# Patient Record
Sex: FEMALE | Race: WHITE | NOT HISPANIC OR LATINO | Employment: UNEMPLOYED | URBAN - NONMETROPOLITAN AREA
[De-identification: names, ages, dates, MRNs, and addresses within clinical notes are randomized per-mention and may not be internally consistent; named-entity substitution may affect disease eponyms.]

---

## 2017-09-27 ENCOUNTER — HOSPITAL ENCOUNTER (OUTPATIENT)
Dept: FAMILY MEDICINE CLINIC | Facility: CLINIC | Age: 42
Setting detail: SPECIMEN
Discharge: HOME | End: 2017-09-27
Attending: NURSE PRACTITIONER | Admitting: NURSE PRACTITIONER

## 2017-12-12 ENCOUNTER — HOSPITAL ENCOUNTER (OUTPATIENT)
Dept: ONCOLOGY | Facility: HOSPITAL | Age: 42
Discharge: HOME | End: 2017-12-12
Attending: INTERNAL MEDICINE | Admitting: INTERNAL MEDICINE

## 2017-12-12 ENCOUNTER — HOSPITAL ENCOUNTER (OUTPATIENT)
Dept: ONCOLOGY | Facility: CLINIC | Age: 42
Setting detail: INFUSION SERIES
Discharge: HOME | End: 2017-12-12
Attending: INTERNAL MEDICINE | Admitting: INTERNAL MEDICINE

## 2017-12-12 ENCOUNTER — CLINICAL SUPPORT (OUTPATIENT)
Dept: ONCOLOGY | Facility: HOSPITAL | Age: 42
End: 2017-12-12

## 2017-12-12 LAB
FERRITIN SERPL-MCNC: 26 NG/ML (ref 11–307)
FOLATE SERPL-MCNC: >24.8 NG/ML (ref 5.9–24.8)
IRON SATN MFR SERPL: 54 % (ref 15–50)
IRON SERPL-MCNC: 300 UG/DL (ref 28–170)
MAGNESIUM UR-MCNC: 1.95 % (ref 0.5–1.5)
RETICS/RBC NFR MANUAL: 0.07 10*6/UL
TIBC SERPL-MCNC: 559 UG/DL (ref 228–428)

## 2017-12-13 NOTE — PROGRESS NOTES
PATIENTS ONCOLOGY RECORD LOCATED IN [facility]      Subjective     Name:  ROBERTO LFOOD     Date:  2017  Address:  [address] [place] [address] [place] [facility]  Home: [phone]  :  1975 AGE:  42 y.o.        RECORDS OBTAINED:  Patients Oncology Record is located in [facility]

## 2018-01-15 ENCOUNTER — CLINICAL SUPPORT (OUTPATIENT)
Dept: ONCOLOGY | Facility: HOSPITAL | Age: 43
End: 2018-01-15

## 2018-01-15 ENCOUNTER — HOSPITAL ENCOUNTER (OUTPATIENT)
Dept: ONCOLOGY | Facility: CLINIC | Age: 43
Setting detail: INFUSION SERIES
Discharge: HOME | End: 2018-01-15
Attending: INTERNAL MEDICINE | Admitting: INTERNAL MEDICINE

## 2018-01-15 NOTE — PROGRESS NOTES
PATIENTS ONCOLOGY RECORD LOCATED IN [facility]      Subjective     Name:  ROBERTO FLOOD     Date:  01/15/2018  Address:  [address] [place] [address] [place] [facility]  Home: [phone]  :  1975 AGE:  42 y.o.        RECORDS OBTAINED:  Patients Oncology Record is located in [facility]

## 2018-01-29 ENCOUNTER — HOSPITAL ENCOUNTER (OUTPATIENT)
Dept: SLEEP MEDICINE | Facility: HOSPITAL | Age: 43
Discharge: HOME | End: 2018-01-29
Attending: PSYCHIATRY & NEUROLOGY | Admitting: PSYCHIATRY & NEUROLOGY

## 2018-03-13 ENCOUNTER — HOSPITAL ENCOUNTER (OUTPATIENT)
Dept: FAMILY MEDICINE CLINIC | Facility: CLINIC | Age: 43
Setting detail: SPECIMEN
Discharge: HOME | End: 2018-03-13
Attending: FAMILY MEDICINE | Admitting: FAMILY MEDICINE

## 2018-03-13 LAB
ALBUMIN SERPL-MCNC: 3.5 G/DL (ref 3.5–4.8)
ALBUMIN/GLOB SERPL: 1.2 {RATIO} (ref 1–1.7)
ALP SERPL-CCNC: 57 IU/L (ref 32–91)
ALT SERPL-CCNC: 43 IU/L (ref 14–54)
ANION GAP SERPL CALC-SCNC: 17.2 MMOL/L (ref 10–20)
AST SERPL-CCNC: 52 IU/L (ref 15–41)
BILIRUB SERPL-MCNC: 0.6 MG/DL (ref 0.3–1.2)
BUN SERPL-MCNC: 8 MG/DL (ref 8–20)
BUN/CREAT SERPL: 13.3 (ref 5.4–26.2)
CALCIUM SERPL-MCNC: 9.2 MG/DL (ref 8.9–10.3)
CHLORIDE SERPL-SCNC: 100 MMOL/L (ref 101–111)
CONV CO2: 23 MMOL/L (ref 22–32)
CONV TOTAL PROTEIN: 6.4 G/DL (ref 6.1–7.9)
CREAT UR-MCNC: 0.6 MG/DL (ref 0.4–1)
GLOBULIN UR ELPH-MCNC: 2.9 G/DL (ref 2.5–3.8)
GLUCOSE SERPL-MCNC: 82 MG/DL (ref 65–99)
POTASSIUM SERPL-SCNC: 4.2 MMOL/L (ref 3.6–5.1)
SODIUM SERPL-SCNC: 136 MMOL/L (ref 136–144)

## 2018-08-01 ENCOUNTER — HOSPITAL ENCOUNTER (OUTPATIENT)
Dept: FAMILY MEDICINE CLINIC | Facility: CLINIC | Age: 43
Setting detail: SPECIMEN
Discharge: HOME | End: 2018-08-01
Attending: FAMILY MEDICINE | Admitting: FAMILY MEDICINE

## 2018-08-01 LAB
ALBUMIN SERPL-MCNC: 3.8 G/DL (ref 3.5–4.8)
ALBUMIN/GLOB SERPL: 1.1 {RATIO} (ref 1–1.7)
ALP SERPL-CCNC: 58 IU/L (ref 32–91)
ALT SERPL-CCNC: 50 IU/L (ref 14–54)
ANION GAP SERPL CALC-SCNC: 11.5 MMOL/L (ref 10–20)
AST SERPL-CCNC: 48 IU/L (ref 15–41)
BASOPHILS # BLD AUTO: 0 10*3/UL (ref 0–0.2)
BASOPHILS NFR BLD AUTO: 1 % (ref 0–2)
BILIRUB SERPL-MCNC: 0.4 MG/DL (ref 0.3–1.2)
BUN SERPL-MCNC: 8 MG/DL (ref 8–20)
BUN/CREAT SERPL: 11.4 (ref 5.4–26.2)
CALCIUM SERPL-MCNC: 9.1 MG/DL (ref 8.9–10.3)
CHLORIDE SERPL-SCNC: 101 MMOL/L (ref 101–111)
CONV CO2: 28 MMOL/L (ref 22–32)
CONV TOTAL PROTEIN: 7.4 G/DL (ref 6.1–7.9)
CREAT UR-MCNC: 0.7 MG/DL (ref 0.4–1)
DIFFERENTIAL METHOD BLD: (no result)
EOSINOPHIL # BLD AUTO: 0.2 10*3/UL (ref 0–0.3)
EOSINOPHIL # BLD AUTO: 3 % (ref 0–3)
ERYTHROCYTE [DISTWIDTH] IN BLOOD BY AUTOMATED COUNT: 14.1 % (ref 11.5–14.5)
GLOBULIN UR ELPH-MCNC: 3.6 G/DL (ref 2.5–3.8)
GLUCOSE SERPL-MCNC: 80 MG/DL (ref 65–99)
HCT VFR BLD AUTO: 40.1 % (ref 35–49)
HGB BLD-MCNC: 14.2 G/DL (ref 12–15)
IRON SATN MFR SERPL: 24 % (ref 15–50)
IRON SERPL-MCNC: 94 UG/DL (ref 28–170)
LYMPHOCYTES # BLD AUTO: 2.1 10*3/UL (ref 0.8–4.8)
LYMPHOCYTES NFR BLD AUTO: 34 % (ref 18–42)
MCH RBC QN AUTO: 34.7 PG (ref 26–32)
MCHC RBC AUTO-ENTMCNC: 35.4 G/DL (ref 32–36)
MCV RBC AUTO: 98 FL (ref 80–94)
MONOCYTES # BLD AUTO: 0.4 10*3/UL (ref 0.1–1.3)
MONOCYTES NFR BLD AUTO: 7 % (ref 2–11)
NEUTROPHILS # BLD AUTO: 3.4 10*3/UL (ref 2.3–8.6)
NEUTROPHILS NFR BLD AUTO: 55 % (ref 50–75)
NRBC BLD AUTO-RTO: 0 /100{WBCS}
NRBC/RBC NFR BLD MANUAL: 0 10*3/UL
PLATELET # BLD AUTO: 222 10*3/UL (ref 150–450)
PMV BLD AUTO: 9 FL (ref 7.4–10.4)
POTASSIUM SERPL-SCNC: 3.5 MMOL/L (ref 3.6–5.1)
RBC # BLD AUTO: 4.09 10*6/UL (ref 4–5.4)
SODIUM SERPL-SCNC: 137 MMOL/L (ref 136–144)
TIBC SERPL-MCNC: 392 UG/DL (ref 228–428)
WBC # BLD AUTO: 6 10*3/UL (ref 4.5–11.5)

## 2018-08-02 LAB
CONV HIV-1/ HIV-2: NORMAL
CONV HIV-1/ HIV-2: NORMAL
HAV IGM SERPL QL IA: NONREACTIVE
HBV CORE IGM SERPL QL IA: NONREACTIVE
HBV SURFACE AG SERPL QL IA: NONREACTIVE
HCV AB SER DONR QL: ABNORMAL

## 2018-10-24 ENCOUNTER — HOSPITAL ENCOUNTER (OUTPATIENT)
Dept: FAMILY MEDICINE CLINIC | Facility: CLINIC | Age: 43
Setting detail: SPECIMEN
Discharge: HOME | End: 2018-10-24
Attending: FAMILY MEDICINE | Admitting: FAMILY MEDICINE

## 2018-10-24 LAB
FERRITIN SERPL-MCNC: 63 NG/ML (ref 11–307)
IRON SERPL-MCNC: 140 UG/DL (ref 28–170)

## 2018-12-20 ENCOUNTER — HOSPITAL ENCOUNTER (OUTPATIENT)
Dept: FAMILY MEDICINE CLINIC | Facility: CLINIC | Age: 43
Setting detail: SPECIMEN
Discharge: HOME | End: 2018-12-20
Attending: FAMILY MEDICINE | Admitting: FAMILY MEDICINE

## 2018-12-20 ENCOUNTER — CONVERSION ENCOUNTER (OUTPATIENT)
Dept: CARDIOLOGY | Facility: CLINIC | Age: 43
End: 2018-12-20

## 2018-12-20 LAB
ANION GAP SERPL CALC-SCNC: 13.2 MMOL/L (ref 10–20)
BUN SERPL-MCNC: 7 MG/DL (ref 8–20)
BUN/CREAT SERPL: 10 (ref 5.4–26.2)
CALCIUM SERPL-MCNC: 8.7 MG/DL (ref 8.9–10.3)
CHLORIDE SERPL-SCNC: 99 MMOL/L (ref 101–111)
CONV CO2: 25 MMOL/L (ref 22–32)
CREAT UR-MCNC: 0.7 MG/DL (ref 0.4–1)
GLUCOSE SERPL-MCNC: 62 MG/DL (ref 65–99)
POTASSIUM SERPL-SCNC: 3.2 MMOL/L (ref 3.6–5.1)
SODIUM SERPL-SCNC: 134 MMOL/L (ref 136–144)

## 2019-01-30 ENCOUNTER — HOSPITAL ENCOUNTER (OUTPATIENT)
Dept: FAMILY MEDICINE CLINIC | Facility: CLINIC | Age: 44
Setting detail: SPECIMEN
Discharge: HOME | End: 2019-01-30
Attending: FAMILY MEDICINE | Admitting: FAMILY MEDICINE

## 2019-01-30 LAB
ANION GAP SERPL CALC-SCNC: 14.1 MMOL/L (ref 10–20)
BASOPHILS # BLD AUTO: 0.1 10*3/UL (ref 0–0.2)
BASOPHILS NFR BLD AUTO: 1 % (ref 0–2)
BUN SERPL-MCNC: 9 MG/DL (ref 8–20)
BUN/CREAT SERPL: 12.9 (ref 5.4–26.2)
CALCIUM SERPL-MCNC: 9.1 MG/DL (ref 8.9–10.3)
CHLORIDE SERPL-SCNC: 101 MMOL/L (ref 101–111)
CONV CO2: 25 MMOL/L (ref 22–32)
CREAT UR-MCNC: 0.7 MG/DL (ref 0.4–1)
DIFFERENTIAL METHOD BLD: (no result)
EOSINOPHIL # BLD AUTO: 0.2 10*3/UL (ref 0–0.3)
EOSINOPHIL # BLD AUTO: 4 % (ref 0–3)
ERYTHROCYTE [DISTWIDTH] IN BLOOD BY AUTOMATED COUNT: 14 % (ref 11.5–14.5)
GLUCOSE SERPL-MCNC: 68 MG/DL (ref 65–99)
HCT VFR BLD AUTO: 45.7 % (ref 35–49)
HGB BLD-MCNC: 15.8 G/DL (ref 12–15)
LYMPHOCYTES # BLD AUTO: 2.2 10*3/UL (ref 0.8–4.8)
LYMPHOCYTES NFR BLD AUTO: 39 % (ref 18–42)
MCH RBC QN AUTO: 34.3 PG (ref 26–32)
MCHC RBC AUTO-ENTMCNC: 34.6 G/DL (ref 32–36)
MCV RBC AUTO: 98.9 FL (ref 80–94)
MONOCYTES # BLD AUTO: 0.4 10*3/UL (ref 0.1–1.3)
MONOCYTES NFR BLD AUTO: 8 % (ref 2–11)
NEUTROPHILS # BLD AUTO: 2.8 10*3/UL (ref 2.3–8.6)
NEUTROPHILS NFR BLD AUTO: 48 % (ref 50–75)
NRBC BLD AUTO-RTO: 0 /100{WBCS}
NRBC/RBC NFR BLD MANUAL: 0 10*3/UL
PLATELET # BLD AUTO: 186 10*3/UL (ref 150–450)
PMV BLD AUTO: 9.9 FL (ref 7.4–10.4)
POTASSIUM SERPL-SCNC: 4.1 MMOL/L (ref 3.6–5.1)
RBC # BLD AUTO: 4.62 10*6/UL (ref 4–5.4)
SODIUM SERPL-SCNC: 136 MMOL/L (ref 136–144)
WBC # BLD AUTO: 5.7 10*3/UL (ref 4.5–11.5)

## 2019-06-03 VITALS — DIASTOLIC BLOOD PRESSURE: 96 MMHG | SYSTOLIC BLOOD PRESSURE: 157 MMHG

## 2019-06-18 ENCOUNTER — TELEPHONE (OUTPATIENT)
Dept: CARDIOLOGY | Facility: CLINIC | Age: 44
End: 2019-06-18

## 2019-06-18 NOTE — TELEPHONE ENCOUNTER
Pt called requesting cardiac clearance to have an ablation. Please fax letter to [facility] at [phone]

## 2019-06-19 ENCOUNTER — TELEPHONE (OUTPATIENT)
Dept: CARDIOLOGY | Facility: CLINIC | Age: 44
End: 2019-06-19

## 2019-06-19 RX ORDER — BUSPIRONE HYDROCHLORIDE 15 MG/1
TABLET ORAL
Qty: 90 TABLET | Refills: 0 | Status: SHIPPED | OUTPATIENT
Start: 2019-06-19 | End: 2019-07-17 | Stop reason: SDUPTHER

## 2019-06-28 DIAGNOSIS — I63.89 CEREBROVASCULAR ACCIDENT (CVA) DUE TO OTHER MECHANISM (HCC): Primary | ICD-10-CM

## 2019-06-28 RX ORDER — CLOPIDOGREL BISULFATE 75 MG/1
75 TABLET ORAL DAILY
Qty: 30 TABLET | Refills: 1 | Status: SHIPPED | OUTPATIENT
Start: 2019-06-28 | End: 2019-07-01 | Stop reason: SDUPTHER

## 2019-07-01 RX ORDER — CLOPIDOGREL BISULFATE 75 MG/1
75 TABLET ORAL DAILY
Qty: 30 TABLET | Refills: 1 | Status: SHIPPED | OUTPATIENT
Start: 2019-07-01 | End: 2019-07-03 | Stop reason: SDUPTHER

## 2019-07-03 RX ORDER — CLOPIDOGREL BISULFATE 75 MG/1
75 TABLET ORAL DAILY
Qty: 30 TABLET | Refills: 0 | Status: SHIPPED | OUTPATIENT
Start: 2019-07-03 | End: 2019-11-06 | Stop reason: SDUPTHER

## 2019-07-17 RX ORDER — BUSPIRONE HYDROCHLORIDE 15 MG/1
TABLET ORAL
Qty: 90 TABLET | Refills: 0 | Status: SHIPPED | OUTPATIENT
Start: 2019-07-17 | End: 2019-08-20 | Stop reason: SDUPTHER

## 2019-07-30 ENCOUNTER — OFFICE VISIT (OUTPATIENT)
Dept: FAMILY MEDICINE CLINIC | Facility: CLINIC | Age: 44
End: 2019-07-30

## 2019-07-30 VITALS
TEMPERATURE: 97.9 F | OXYGEN SATURATION: 97 % | HEIGHT: 65 IN | WEIGHT: 273 LBS | DIASTOLIC BLOOD PRESSURE: 87 MMHG | SYSTOLIC BLOOD PRESSURE: 123 MMHG | HEART RATE: 73 BPM | BODY MASS INDEX: 45.48 KG/M2

## 2019-07-30 DIAGNOSIS — I10 ESSENTIAL HYPERTENSION: ICD-10-CM

## 2019-07-30 DIAGNOSIS — E87.6 HYPOKALEMIA: Primary | ICD-10-CM

## 2019-07-30 DIAGNOSIS — F17.200 TOBACCO USE DISORDER: ICD-10-CM

## 2019-07-30 DIAGNOSIS — B19.20 HEPATITIS C VIRUS INFECTION WITHOUT HEPATIC COMA, UNSPECIFIED CHRONICITY: ICD-10-CM

## 2019-07-30 PROCEDURE — 99213 OFFICE O/P EST LOW 20 MIN: CPT | Performed by: FAMILY MEDICINE

## 2019-07-30 RX ORDER — METOPROLOL SUCCINATE 25 MG/1
25 TABLET, EXTENDED RELEASE ORAL DAILY
Refills: 3 | COMMUNITY
Start: 2019-06-30 | End: 2020-07-06

## 2019-07-30 RX ORDER — LOSARTAN POTASSIUM 100 MG/1
100 TABLET ORAL DAILY
Refills: 0 | COMMUNITY
Start: 2019-05-02 | End: 2020-02-05 | Stop reason: SDUPTHER

## 2019-07-30 RX ORDER — HYDROCHLOROTHIAZIDE 12.5 MG/1
12.5 CAPSULE, GELATIN COATED ORAL DAILY
Refills: 0 | COMMUNITY
Start: 2019-05-23 | End: 2019-08-29 | Stop reason: SDUPTHER

## 2019-07-30 RX ORDER — ISOSORBIDE MONONITRATE 30 MG/1
30 TABLET, EXTENDED RELEASE ORAL DAILY
Refills: 3 | COMMUNITY
Start: 2019-07-10 | End: 2019-08-08 | Stop reason: SDUPTHER

## 2019-07-30 RX ORDER — FERROUS SULFATE 325(65) MG
1 TABLET ORAL DAILY
Refills: 0 | COMMUNITY
Start: 2019-05-28 | End: 2019-09-03 | Stop reason: SDUPTHER

## 2019-07-30 RX ORDER — ESCITALOPRAM OXALATE 10 MG/1
10 TABLET ORAL DAILY
Refills: 1 | COMMUNITY
Start: 2019-06-11 | End: 2020-01-08 | Stop reason: SDUPTHER

## 2019-07-30 RX ORDER — ASPIRIN 81 MG/1
81 TABLET, CHEWABLE ORAL DAILY
COMMUNITY
Start: 2019-04-24

## 2019-07-30 RX ORDER — POTASSIUM CHLORIDE 1500 MG/1
20 TABLET, EXTENDED RELEASE ORAL DAILY
Refills: 0 | COMMUNITY
Start: 2019-05-02 | End: 2019-07-30 | Stop reason: SDUPTHER

## 2019-07-30 RX ORDER — POTASSIUM CHLORIDE 1500 MG/1
20 TABLET, EXTENDED RELEASE ORAL DAILY
Qty: 90 TABLET | Refills: 1 | Status: SHIPPED | OUTPATIENT
Start: 2019-07-30 | End: 2019-11-06 | Stop reason: SDUPTHER

## 2019-07-30 RX ORDER — AMLODIPINE BESYLATE 10 MG/1
10 TABLET ORAL DAILY
Refills: 3 | COMMUNITY
Start: 2019-06-27 | End: 2019-08-29 | Stop reason: SDUPTHER

## 2019-07-30 RX ORDER — ATORVASTATIN CALCIUM 10 MG/1
10 TABLET, FILM COATED ORAL
Refills: 5 | COMMUNITY
Start: 2019-06-27 | End: 2019-08-24 | Stop reason: SDUPTHER

## 2019-07-30 NOTE — PROGRESS NOTES
Subjective   Odalis Kaur is a 43 y.o. female.     No problems updated.  History of Present Illness   Pt had uterine ablation 19 and is doing well.  She needs refills of med for hypokalemia.  Labs reviewed    The following portions of the patient's history were reviewed and updated as appropriate: allergies, current medications, past family history, past medical history, past social history, past surgical history and problem list.    Past Medical History:   Diagnosis Date   • Adnexal cyst     LESION   • B12 deficiency    • Depression    • Hepatitis C     TREATMENT PENDING GI   • Hyperlipidemia    • MICHELE (obstructive sleep apnea)    • PAD (peripheral artery disease) (CMS/HCC)    • Paranasal sinus disease    • Personal history of DVT (deep vein thrombosis)    • Pulmonary nodule    • Snoring    • Stroke (CMS/HCC)     LEFT LIP AND SLIGHT FACIAL DROOP 100% OCCLUSION OF  TELMA   • Tobacco use    • UTI (urinary tract infection)        Past Surgical History:   Procedure Laterality Date   •  SECTION     • TONSILLECTOMY     • TUBAL ABDOMINAL LIGATION Bilateral        Family History   Problem Relation Age of Onset   • Hypertension Mother    • Cancer Maternal Grandmother    • Bone cancer Maternal Grandmother    • Brain cancer Maternal Grandmother    • Prostate cancer Maternal Grandfather        Review of Systems   Constitutional: Negative for fatigue, unexpected weight gain and unexpected weight loss.   HENT: Negative for congestion, sinus pressure and sore throat.    Eyes: Negative for blurred vision and visual disturbance.   Respiratory: Negative for cough, shortness of breath and wheezing.    Cardiovascular: Negative for chest pain, palpitations and leg swelling.   Gastrointestinal: Negative for abdominal pain, constipation, diarrhea, nausea, vomiting, GERD and indigestion.   Musculoskeletal: Negative for arthralgias, back pain, gait problem, joint swelling and neck pain.   Skin: Negative for rash, skin lesions  and bruise.   Allergic/Immunologic: Negative for environmental allergies.   Neurological: Negative for dizziness, tremors, syncope, weakness, light-headedness, headache and memory problem.   Psychiatric/Behavioral: Negative for sleep disturbance, depressed mood and stress. The patient is not nervous/anxious.        Objective   Physical Exam   Constitutional: She is oriented to person, place, and time. She appears well-developed and well-nourished. No distress.   HENT:   Head: Normocephalic and atraumatic.   Right Ear: External ear normal.   Left Ear: External ear normal.   Nose: Nose normal.   Mouth/Throat: Oropharynx is clear and moist.   Eyes: EOM are normal. Pupils are equal, round, and reactive to light.   Neck: Normal range of motion. Neck supple. No thyromegaly present.   Cardiovascular: Normal rate, regular rhythm and normal heart sounds. Exam reveals no gallop and no friction rub.   No murmur heard.  Pulmonary/Chest: Effort normal. She has no wheezes.   Abdominal: Soft. There is no tenderness.   Musculoskeletal: Normal range of motion. She exhibits no edema, tenderness or deformity.   Lymphadenopathy:     She has no cervical adenopathy.   Neurological: She is alert and oriented to person, place, and time. No cranial nerve deficit.   Skin: Skin is warm and dry. No rash noted. She is not diaphoretic.   Psychiatric: She has a normal mood and affect. Her behavior is normal. Judgment and thought content normal.   Nursing note and vitals reviewed.        Assessment/Plan   There are no diagnoses linked to this encounter.           Chief Complaint   Patient presents with   • Follow-up     3 month follow up/HTN     Vitals:    07/30/19 1210   BP: 123/87   Pulse: 73   Temp: 97.9 °F (36.6 °C)   SpO2: 97%     No results found for: HGBA1C, POCGLU, LDL

## 2019-08-08 RX ORDER — ISOSORBIDE MONONITRATE 30 MG/1
TABLET, EXTENDED RELEASE ORAL
Qty: 30 TABLET | Refills: 3 | Status: SHIPPED | OUTPATIENT
Start: 2019-08-08 | End: 2019-12-18 | Stop reason: SDUPTHER

## 2019-08-21 RX ORDER — BUSPIRONE HYDROCHLORIDE 15 MG/1
TABLET ORAL
Qty: 90 TABLET | Refills: 4 | Status: SHIPPED | OUTPATIENT
Start: 2019-08-21 | End: 2020-02-05 | Stop reason: SDUPTHER

## 2019-08-26 RX ORDER — ATORVASTATIN CALCIUM 10 MG/1
TABLET, FILM COATED ORAL
Qty: 30 TABLET | Refills: 5 | Status: SHIPPED | OUTPATIENT
Start: 2019-08-26 | End: 2020-03-06

## 2019-08-29 RX ORDER — HYDROCHLOROTHIAZIDE 12.5 MG/1
12.5 CAPSULE, GELATIN COATED ORAL DAILY
Qty: 90 CAPSULE | Refills: 0 | Status: SHIPPED | OUTPATIENT
Start: 2019-08-29 | End: 2019-12-06 | Stop reason: SDUPTHER

## 2019-08-29 RX ORDER — AMLODIPINE BESYLATE 10 MG/1
10 TABLET ORAL DAILY
Qty: 90 TABLET | Refills: 2 | Status: SHIPPED | OUTPATIENT
Start: 2019-08-29 | End: 2019-10-02 | Stop reason: SDUPTHER

## 2019-09-03 RX ORDER — FERROUS SULFATE 325(65) MG
1 TABLET ORAL DAILY
Qty: 90 TABLET | Refills: 0 | Status: SHIPPED | OUTPATIENT
Start: 2019-09-03 | End: 2019-12-06 | Stop reason: SDUPTHER

## 2019-10-02 RX ORDER — AMLODIPINE BESYLATE 10 MG/1
TABLET ORAL
Qty: 30 TABLET | Refills: 0 | Status: SHIPPED | OUTPATIENT
Start: 2019-10-02 | End: 2019-11-02 | Stop reason: SDUPTHER

## 2019-11-04 RX ORDER — AMLODIPINE BESYLATE 10 MG/1
TABLET ORAL
Qty: 30 TABLET | Refills: 0 | Status: SHIPPED | OUTPATIENT
Start: 2019-11-04 | End: 2019-12-02 | Stop reason: SDUPTHER

## 2019-11-06 ENCOUNTER — OFFICE VISIT (OUTPATIENT)
Dept: FAMILY MEDICINE CLINIC | Facility: CLINIC | Age: 44
End: 2019-11-06

## 2019-11-06 VITALS
TEMPERATURE: 97.3 F | SYSTOLIC BLOOD PRESSURE: 145 MMHG | HEIGHT: 65 IN | DIASTOLIC BLOOD PRESSURE: 85 MMHG | BODY MASS INDEX: 44.98 KG/M2 | OXYGEN SATURATION: 100 % | RESPIRATION RATE: 18 BRPM | HEART RATE: 61 BPM | WEIGHT: 270 LBS

## 2019-11-06 DIAGNOSIS — Z23 NEED FOR PROPHYLACTIC VACCINATION AND INOCULATION AGAINST INFLUENZA: Primary | ICD-10-CM

## 2019-11-06 DIAGNOSIS — I63.89 CEREBROVASCULAR ACCIDENT (CVA) DUE TO OTHER MECHANISM (HCC): ICD-10-CM

## 2019-11-06 DIAGNOSIS — E78.2 MIXED HYPERLIPIDEMIA: ICD-10-CM

## 2019-11-06 DIAGNOSIS — E53.8 VITAMIN B12 DEFICIENCY: ICD-10-CM

## 2019-11-06 DIAGNOSIS — E87.6 HYPOKALEMIA: ICD-10-CM

## 2019-11-06 DIAGNOSIS — R53.83 OTHER FATIGUE: ICD-10-CM

## 2019-11-06 DIAGNOSIS — E55.9 VITAMIN D DEFICIENCY: ICD-10-CM

## 2019-11-06 DIAGNOSIS — R73.9 HYPERGLYCEMIA: ICD-10-CM

## 2019-11-06 PROCEDURE — 99213 OFFICE O/P EST LOW 20 MIN: CPT | Performed by: NURSE PRACTITIONER

## 2019-11-06 PROCEDURE — 90674 CCIIV4 VAC NO PRSV 0.5 ML IM: CPT | Performed by: NURSE PRACTITIONER

## 2019-11-06 PROCEDURE — 90471 IMMUNIZATION ADMIN: CPT | Performed by: NURSE PRACTITIONER

## 2019-11-06 RX ORDER — POTASSIUM CHLORIDE 1500 MG/1
20 TABLET, EXTENDED RELEASE ORAL DAILY
Qty: 90 TABLET | Refills: 1 | Status: SHIPPED | OUTPATIENT
Start: 2019-11-06 | End: 2020-02-05 | Stop reason: SDUPTHER

## 2019-11-06 RX ORDER — CLOPIDOGREL BISULFATE 75 MG/1
75 TABLET ORAL DAILY
Qty: 90 TABLET | Refills: 0 | Status: SHIPPED | OUTPATIENT
Start: 2019-11-06 | End: 2020-02-05 | Stop reason: SDUPTHER

## 2019-12-02 RX ORDER — AMLODIPINE BESYLATE 10 MG/1
TABLET ORAL
Qty: 30 TABLET | Refills: 0 | Status: SHIPPED | OUTPATIENT
Start: 2019-12-02 | End: 2020-01-06

## 2019-12-02 RX ORDER — PNV NO.95/FERROUS FUM/FOLIC AC 28MG-0.8MG
TABLET ORAL
Qty: 90 TABLET | Refills: 0 | Status: CANCELLED | OUTPATIENT
Start: 2019-12-02

## 2019-12-06 RX ORDER — HYDROCHLOROTHIAZIDE 12.5 MG/1
12.5 CAPSULE, GELATIN COATED ORAL DAILY
Qty: 90 CAPSULE | Refills: 0 | Status: SHIPPED | OUTPATIENT
Start: 2019-12-06 | End: 2020-02-05 | Stop reason: SDUPTHER

## 2019-12-06 RX ORDER — FERROUS SULFATE 325(65) MG
1 TABLET ORAL DAILY
Qty: 90 TABLET | Refills: 0 | Status: SHIPPED | OUTPATIENT
Start: 2019-12-06 | End: 2020-02-05 | Stop reason: SDUPTHER

## 2019-12-18 RX ORDER — ISOSORBIDE MONONITRATE 30 MG/1
TABLET, EXTENDED RELEASE ORAL
Qty: 90 TABLET | Refills: 1 | Status: SHIPPED | OUTPATIENT
Start: 2019-12-18 | End: 2020-06-22

## 2020-01-06 RX ORDER — AMLODIPINE BESYLATE 10 MG/1
TABLET ORAL
Qty: 30 TABLET | Refills: 0 | Status: SHIPPED | OUTPATIENT
Start: 2020-01-06 | End: 2020-02-03

## 2020-01-08 RX ORDER — ESCITALOPRAM OXALATE 10 MG/1
10 TABLET ORAL DAILY
Qty: 90 TABLET | Refills: 0 | Status: SHIPPED | OUTPATIENT
Start: 2020-01-08 | End: 2020-02-05 | Stop reason: SDUPTHER

## 2020-02-03 RX ORDER — AMLODIPINE BESYLATE 10 MG/1
TABLET ORAL
Qty: 30 TABLET | Refills: 0 | Status: SHIPPED | OUTPATIENT
Start: 2020-02-03 | End: 2020-02-05 | Stop reason: SDUPTHER

## 2020-02-05 ENCOUNTER — OFFICE VISIT (OUTPATIENT)
Dept: FAMILY MEDICINE CLINIC | Facility: CLINIC | Age: 45
End: 2020-02-05

## 2020-02-05 VITALS
TEMPERATURE: 98 F | DIASTOLIC BLOOD PRESSURE: 86 MMHG | WEIGHT: 265 LBS | OXYGEN SATURATION: 98 % | BODY MASS INDEX: 44.15 KG/M2 | SYSTOLIC BLOOD PRESSURE: 129 MMHG | HEIGHT: 65 IN | HEART RATE: 70 BPM

## 2020-02-05 DIAGNOSIS — Z12.39 SCREENING FOR MALIGNANT NEOPLASM OF BREAST: ICD-10-CM

## 2020-02-05 DIAGNOSIS — N39.46 MIXED STRESS AND URGE URINARY INCONTINENCE: ICD-10-CM

## 2020-02-05 DIAGNOSIS — E61.1 IRON DEFICIENCY: ICD-10-CM

## 2020-02-05 DIAGNOSIS — I63.89 CEREBROVASCULAR ACCIDENT (CVA) DUE TO OTHER MECHANISM (HCC): ICD-10-CM

## 2020-02-05 DIAGNOSIS — Z79.899 MEDICATION MANAGEMENT: ICD-10-CM

## 2020-02-05 DIAGNOSIS — E78.49 OTHER HYPERLIPIDEMIA: ICD-10-CM

## 2020-02-05 DIAGNOSIS — E55.9 VITAMIN D DEFICIENCY: Primary | ICD-10-CM

## 2020-02-05 DIAGNOSIS — R53.82 CHRONIC FATIGUE: ICD-10-CM

## 2020-02-05 PROCEDURE — 80053 COMPREHEN METABOLIC PANEL: CPT | Performed by: FAMILY MEDICINE

## 2020-02-05 PROCEDURE — 82607 VITAMIN B-12: CPT | Performed by: FAMILY MEDICINE

## 2020-02-05 PROCEDURE — 84466 ASSAY OF TRANSFERRIN: CPT | Performed by: FAMILY MEDICINE

## 2020-02-05 PROCEDURE — 82306 VITAMIN D 25 HYDROXY: CPT | Performed by: FAMILY MEDICINE

## 2020-02-05 PROCEDURE — 85025 COMPLETE CBC W/AUTO DIFF WBC: CPT | Performed by: FAMILY MEDICINE

## 2020-02-05 PROCEDURE — 80061 LIPID PANEL: CPT | Performed by: FAMILY MEDICINE

## 2020-02-05 PROCEDURE — 99214 OFFICE O/P EST MOD 30 MIN: CPT | Performed by: FAMILY MEDICINE

## 2020-02-05 PROCEDURE — 83540 ASSAY OF IRON: CPT | Performed by: FAMILY MEDICINE

## 2020-02-05 PROCEDURE — 82728 ASSAY OF FERRITIN: CPT | Performed by: FAMILY MEDICINE

## 2020-02-05 RX ORDER — POTASSIUM CHLORIDE 1500 MG/1
20 TABLET, EXTENDED RELEASE ORAL DAILY
Qty: 90 TABLET | Refills: 1 | Status: SHIPPED | OUTPATIENT
Start: 2020-02-05 | End: 2020-09-09 | Stop reason: SDUPTHER

## 2020-02-05 RX ORDER — BUSPIRONE HYDROCHLORIDE 15 MG/1
15 TABLET ORAL 3 TIMES DAILY
Qty: 270 TABLET | Refills: 1 | Status: SHIPPED | OUTPATIENT
Start: 2020-02-05 | End: 2020-08-03

## 2020-02-05 RX ORDER — LOSARTAN POTASSIUM 100 MG/1
100 TABLET ORAL DAILY
Qty: 90 TABLET | Refills: 1 | Status: SHIPPED | OUTPATIENT
Start: 2020-02-05 | End: 2020-08-25

## 2020-02-05 RX ORDER — CLOPIDOGREL BISULFATE 75 MG/1
75 TABLET ORAL DAILY
Qty: 90 TABLET | Refills: 1 | Status: SHIPPED | OUTPATIENT
Start: 2020-02-05 | End: 2020-05-05

## 2020-02-05 RX ORDER — FERROUS SULFATE 325(65) MG
1 TABLET ORAL DAILY
Qty: 90 TABLET | Refills: 0 | Status: SHIPPED | OUTPATIENT
Start: 2020-02-05 | End: 2020-09-09 | Stop reason: SDUPTHER

## 2020-02-05 RX ORDER — CLOPIDOGREL BISULFATE 75 MG/1
75 TABLET ORAL DAILY
COMMUNITY
End: 2020-08-17 | Stop reason: SDUPTHER

## 2020-02-05 RX ORDER — HYDROCHLOROTHIAZIDE 12.5 MG/1
12.5 CAPSULE, GELATIN COATED ORAL DAILY
Qty: 90 CAPSULE | Refills: 1 | Status: SHIPPED | OUTPATIENT
Start: 2020-02-05 | End: 2020-08-17 | Stop reason: SDUPTHER

## 2020-02-05 RX ORDER — ESCITALOPRAM OXALATE 10 MG/1
10 TABLET ORAL DAILY
Qty: 90 TABLET | Refills: 0 | Status: SHIPPED | OUTPATIENT
Start: 2020-02-05 | End: 2020-07-02 | Stop reason: SDUPTHER

## 2020-02-05 RX ORDER — AMLODIPINE BESYLATE 10 MG/1
10 TABLET ORAL DAILY
Qty: 90 TABLET | Refills: 1 | Status: SHIPPED | OUTPATIENT
Start: 2020-02-05 | End: 2020-08-17 | Stop reason: SDUPTHER

## 2020-02-05 NOTE — PROGRESS NOTES
Subjective   Odalis Kaur is a 44 y.o. female.     Chief Complaint   Patient presents with   • Follow-up     3 month follow up, HTN, refill meds       History of Present Illness   Hx CVA,HTN, Iron Deficiency, HLD, tobacco use disorder, Anxiety  She needs refills of her meds  Pt c/o urgency, incontinence, needs sling as recommended by urology  She has to wear PAD ALL THE TIME  S/o uterine ablation    The following portions of the patient's history were reviewed and updated as appropriate: allergies, current medications, past family history, past medical history, past social history, past surgical history and problem list.    Past Medical History:   Diagnosis Date   • Adnexal cyst     LESION   • B12 deficiency    • Depression    • Hepatitis C     TREATMENT PENDING GI   • Hyperlipidemia    • Obesity    • MICHELE (obstructive sleep apnea)    • PAD (peripheral artery disease) (CMS/HCC)    • Paranasal sinus disease    • Personal history of DVT (deep vein thrombosis)    • Pulmonary nodule    • Snoring    • Stroke (CMS/HCC)     LEFT LIP AND SLIGHT FACIAL DROOP 100% OCCLUSION OF  TELMA   • Tobacco use    • UTI (urinary tract infection)        Past Surgical History:   Procedure Laterality Date   •  SECTION     • HYSTEROSCOPY ENDOMETRIAL ABLATION     • TONSILLECTOMY     • TUBAL ABDOMINAL LIGATION Bilateral        Family History   Problem Relation Age of Onset   • Hypertension Mother    • Cancer Maternal Grandmother    • Bone cancer Maternal Grandmother    • Brain cancer Maternal Grandmother    • Prostate cancer Maternal Grandfather        Review of Systems   Constitutional: Negative for fatigue, unexpected weight gain and unexpected weight loss.   HENT: Negative for congestion, sinus pressure and sore throat.    Eyes: Negative for blurred vision and visual disturbance.   Respiratory: Negative for cough, shortness of breath and wheezing.    Cardiovascular: Negative for chest pain, palpitations and leg swelling.    Gastrointestinal: Negative for abdominal pain, constipation, diarrhea, nausea, vomiting, GERD and indigestion.   Genitourinary: Positive for amenorrhea, menstrual problem (spotting S/p ablation), urgency and urinary incontinence.   Musculoskeletal: Negative for arthralgias, back pain, gait problem, joint swelling and neck pain.   Skin: Negative for rash, skin lesions and bruise.   Allergic/Immunologic: Negative for environmental allergies.   Neurological: Negative for dizziness, tremors, syncope, weakness, light-headedness, headache and memory problem.   Psychiatric/Behavioral: Negative for sleep disturbance, depressed mood and stress. The patient is not nervous/anxious.        Objective   Physical Exam   Constitutional: She is oriented to person, place, and time. She appears well-developed and well-nourished. No distress.   obese   HENT:   Head: Normocephalic and atraumatic.   Right Ear: External ear normal.   Left Ear: External ear normal.   Nose: Nose normal.   Mouth/Throat: Oropharynx is clear and moist.   Eyes: Pupils are equal, round, and reactive to light. EOM are normal.   Neck: Normal range of motion. Neck supple. No thyromegaly present.   Cardiovascular: Normal rate, regular rhythm and normal heart sounds. Exam reveals no gallop and no friction rub.   No murmur heard.  Pulmonary/Chest: Effort normal. She has no wheezes.   Abdominal: Soft. There is no tenderness.   Musculoskeletal: Normal range of motion. She exhibits no edema, tenderness or deformity.   Lymphadenopathy:     She has no cervical adenopathy.   Neurological: She is alert and oriented to person, place, and time. No cranial nerve deficit.   Skin: Skin is warm and dry. No rash noted. She is not diaphoretic.   Psychiatric: She has a normal mood and affect. Her behavior is normal. Judgment and thought content normal.   Nursing note and vitals reviewed.    Vitals:    02/05/20 0828   BP: 129/86   Pulse: 70   Temp: 98 °F (36.7 °C)   SpO2: 98%      Body mass index is 44.1 kg/m².    LDL Cholesterol    Date Value Ref Range Status   02/05/2020 82 0 - 100 mg/dL Final     Results for orders placed or performed in visit on 02/05/20   CBC Auto Differential   Result Value Ref Range    WBC 5.18 3.40 - 10.80 10*3/mm3    RBC 4.15 3.77 - 5.28 10*6/mm3    Hemoglobin 13.9 12.0 - 15.9 g/dL    Hematocrit 40.2 34.0 - 46.6 %    MCV 96.9 79.0 - 97.0 fL    MCH 33.5 (H) 26.6 - 33.0 pg    MCHC 34.6 31.5 - 35.7 g/dL    RDW 12.9 12.3 - 15.4 %    RDW-SD 45.6 37.0 - 54.0 fl    MPV 11.0 6.0 - 12.0 fL    Platelets 246 140 - 450 10*3/mm3    Neutrophil % 55.8 42.7 - 76.0 %    Lymphocyte % 32.8 19.6 - 45.3 %    Monocyte % 6.9 5.0 - 12.0 %    Eosinophil % 3.3 0.3 - 6.2 %    Basophil % 1.0 0.0 - 1.5 %    Immature Grans % 0.2 0.0 - 0.5 %    Neutrophils, Absolute 2.89 1.70 - 7.00 10*3/mm3    Lymphocytes, Absolute 1.70 0.70 - 3.10 10*3/mm3    Monocytes, Absolute 0.36 0.10 - 0.90 10*3/mm3    Eosinophils, Absolute 0.17 0.00 - 0.40 10*3/mm3    Basophils, Absolute 0.05 0.00 - 0.20 10*3/mm3    Immature Grans, Absolute 0.01 0.00 - 0.05 10*3/mm3    nRBC 0.0 0.0 - 0.2 /100 WBC   Iron Profile   Result Value Ref Range    Iron 107 37 - 145 mcg/dL    Iron Saturation 27 20 - 50 %    Transferrin 265 200 - 360 mg/dL    TIBC 395 298 - 536 mcg/dL   Vitamin D 25 hydroxy   Result Value Ref Range    25 Hydroxy, Vitamin D 16.8 (L) 30.0 - 100.0 ng/ml   Ferritin   Result Value Ref Range    Ferritin 207.00 (H) 13.00 - 150.00 ng/mL   Vitamin B12   Result Value Ref Range    Vitamin B-12 543 211 - 946 pg/mL   Lipid Panel   Result Value Ref Range    Total Cholesterol 135 0 - 200 mg/dL    Triglycerides 109 0 - 150 mg/dL    HDL Cholesterol 31 (L) 40 - 60 mg/dL    LDL Cholesterol  82 0 - 100 mg/dL    VLDL Cholesterol 21.8 5 - 40 mg/dL    LDL/HDL Ratio 2.65    Comprehensive Metabolic Panel   Result Value Ref Range    Glucose 73 65 - 99 mg/dL    BUN 13 6 - 20 mg/dL    Creatinine 0.74 0.57 - 1.00 mg/dL    Sodium 138 136 -  145 mmol/L    Potassium 3.9 3.5 - 5.2 mmol/L    Chloride 98 98 - 107 mmol/L    CO2 24.7 22.0 - 29.0 mmol/L    Calcium 9.4 8.6 - 10.5 mg/dL    Total Protein 7.7 6.0 - 8.5 g/dL    Albumin 4.00 3.50 - 5.20 g/dL    ALT (SGPT) 37 (H) 1 - 33 U/L    AST (SGOT) 34 (H) 1 - 32 U/L    Alkaline Phosphatase 69 39 - 117 U/L    Total Bilirubin 0.6 0.2 - 1.2 mg/dL    eGFR Non African Amer 86 >60 mL/min/1.73    Globulin 3.7 gm/dL    A/G Ratio 1.1 g/dL    BUN/Creatinine Ratio 17.6 7.0 - 25.0    Anion Gap 15.3 (H) 5.0 - 15.0 mmol/L       Assessment/Plan   Odalis was seen today for follow-up.    Diagnoses and all orders for this visit:    Vitamin D deficiency  -     Vitamin D 25 hydroxy; Future  -     Vitamin D 25 hydroxy    Iron deficiency  -     CBC & Differential  -     Iron Profile  -     CBC Auto Differential    Other hyperlipidemia  -     Lipid Panel; Future  -     Lipid Panel    Medication management  -     Comprehensive Metabolic Panel    Chronic fatigue  -     CBC & Differential  -     Ferritin  -     Iron Profile  -     Vitamin B12  -     CBC Auto Differential    Mixed stress and urge urinary incontinence  -     Ambulatory Referral to Urology    Cerebrovascular accident (CVA) due to other mechanism (CMS/HCC)  -     clopidogrel (PLAVIX) 75 MG tablet; Take 1 tablet by mouth Daily for 90 days.    Screening for malignant neoplasm of breast  -     Mammo Screening Digital Tomosynthesis Bilateral With CAD; Future    Other orders  -     amLODIPine (NORVASC) 10 MG tablet; Take 1 tablet by mouth Daily.  -     busPIRone (BUSPAR) 15 MG tablet; Take 1 tablet by mouth 3 (Three) Times a Day.  -     ferrous sulfate 325 (65 FE) MG tablet; Take 1 tablet by mouth Daily.  -     escitalopram (LEXAPRO) 10 MG tablet; Take 1 tablet by mouth Daily.  -     losartan (COZAAR) 100 MG tablet; Take 1 tablet by mouth Daily.  -     hydroCHLOROthiazide (MICROZIDE) 12.5 MG capsule; Take 1 capsule by mouth Daily.  -     KLOR-CON 20 MEQ CR tablet; Take 1 tablet  by mouth Daily.

## 2020-02-06 LAB
25(OH)D3 SERPL-MCNC: 16.8 NG/ML (ref 30–100)
ALBUMIN SERPL-MCNC: 4 G/DL (ref 3.5–5.2)
ALBUMIN/GLOB SERPL: 1.1 G/DL
ALP SERPL-CCNC: 69 U/L (ref 39–117)
ALT SERPL W P-5'-P-CCNC: 37 U/L (ref 1–33)
ANION GAP SERPL CALCULATED.3IONS-SCNC: 15.3 MMOL/L (ref 5–15)
AST SERPL-CCNC: 34 U/L (ref 1–32)
BASOPHILS # BLD AUTO: 0.05 10*3/MM3 (ref 0–0.2)
BASOPHILS NFR BLD AUTO: 1 % (ref 0–1.5)
BILIRUB SERPL-MCNC: 0.6 MG/DL (ref 0.2–1.2)
BUN BLD-MCNC: 13 MG/DL (ref 6–20)
BUN/CREAT SERPL: 17.6 (ref 7–25)
CALCIUM SPEC-SCNC: 9.4 MG/DL (ref 8.6–10.5)
CHLORIDE SERPL-SCNC: 98 MMOL/L (ref 98–107)
CHOLEST SERPL-MCNC: 135 MG/DL (ref 0–200)
CO2 SERPL-SCNC: 24.7 MMOL/L (ref 22–29)
CREAT BLD-MCNC: 0.74 MG/DL (ref 0.57–1)
DEPRECATED RDW RBC AUTO: 45.6 FL (ref 37–54)
EOSINOPHIL # BLD AUTO: 0.17 10*3/MM3 (ref 0–0.4)
EOSINOPHIL NFR BLD AUTO: 3.3 % (ref 0.3–6.2)
ERYTHROCYTE [DISTWIDTH] IN BLOOD BY AUTOMATED COUNT: 12.9 % (ref 12.3–15.4)
FERRITIN SERPL-MCNC: 207 NG/ML (ref 13–150)
GFR SERPL CREATININE-BSD FRML MDRD: 86 ML/MIN/1.73
GLOBULIN UR ELPH-MCNC: 3.7 GM/DL
GLUCOSE BLD-MCNC: 73 MG/DL (ref 65–99)
HCT VFR BLD AUTO: 40.2 % (ref 34–46.6)
HDLC SERPL-MCNC: 31 MG/DL (ref 40–60)
HGB BLD-MCNC: 13.9 G/DL (ref 12–15.9)
IMM GRANULOCYTES # BLD AUTO: 0.01 10*3/MM3 (ref 0–0.05)
IMM GRANULOCYTES NFR BLD AUTO: 0.2 % (ref 0–0.5)
IRON 24H UR-MRATE: 107 MCG/DL (ref 37–145)
IRON SATN MFR SERPL: 27 % (ref 20–50)
LDLC SERPL CALC-MCNC: 82 MG/DL (ref 0–100)
LDLC/HDLC SERPL: 2.65 {RATIO}
LYMPHOCYTES # BLD AUTO: 1.7 10*3/MM3 (ref 0.7–3.1)
LYMPHOCYTES NFR BLD AUTO: 32.8 % (ref 19.6–45.3)
MCH RBC QN AUTO: 33.5 PG (ref 26.6–33)
MCHC RBC AUTO-ENTMCNC: 34.6 G/DL (ref 31.5–35.7)
MCV RBC AUTO: 96.9 FL (ref 79–97)
MONOCYTES # BLD AUTO: 0.36 10*3/MM3 (ref 0.1–0.9)
MONOCYTES NFR BLD AUTO: 6.9 % (ref 5–12)
NEUTROPHILS # BLD AUTO: 2.89 10*3/MM3 (ref 1.7–7)
NEUTROPHILS NFR BLD AUTO: 55.8 % (ref 42.7–76)
NRBC BLD AUTO-RTO: 0 /100 WBC (ref 0–0.2)
PLATELET # BLD AUTO: 246 10*3/MM3 (ref 140–450)
PMV BLD AUTO: 11 FL (ref 6–12)
POTASSIUM BLD-SCNC: 3.9 MMOL/L (ref 3.5–5.2)
PROT SERPL-MCNC: 7.7 G/DL (ref 6–8.5)
RBC # BLD AUTO: 4.15 10*6/MM3 (ref 3.77–5.28)
SODIUM BLD-SCNC: 138 MMOL/L (ref 136–145)
TIBC SERPL-MCNC: 395 MCG/DL (ref 298–536)
TRANSFERRIN SERPL-MCNC: 265 MG/DL (ref 200–360)
TRIGL SERPL-MCNC: 109 MG/DL (ref 0–150)
VIT B12 BLD-MCNC: 543 PG/ML (ref 211–946)
VLDLC SERPL-MCNC: 21.8 MG/DL (ref 5–40)
WBC NRBC COR # BLD: 5.18 10*3/MM3 (ref 3.4–10.8)

## 2020-02-19 ENCOUNTER — TELEPHONE (OUTPATIENT)
Dept: FAMILY MEDICINE CLINIC | Facility: CLINIC | Age: 45
End: 2020-02-19

## 2020-02-19 RX ORDER — ERGOCALCIFEROL 1.25 MG/1
50000 CAPSULE ORAL
Qty: 8 CAPSULE | Refills: 0 | Status: SHIPPED | OUTPATIENT
Start: 2020-02-19 | End: 2020-04-09

## 2020-03-06 RX ORDER — ATORVASTATIN CALCIUM 10 MG/1
TABLET, FILM COATED ORAL
Qty: 30 TABLET | Refills: 5 | Status: SHIPPED | OUTPATIENT
Start: 2020-03-06 | End: 2020-09-02

## 2020-05-28 ENCOUNTER — TELEPHONE (OUTPATIENT)
Dept: CARDIOLOGY | Facility: CLINIC | Age: 45
End: 2020-05-28

## 2020-06-22 RX ORDER — ISOSORBIDE MONONITRATE 30 MG/1
TABLET, EXTENDED RELEASE ORAL
Qty: 30 TABLET | Refills: 0 | Status: SHIPPED | OUTPATIENT
Start: 2020-06-22 | End: 2020-08-17

## 2020-07-02 RX ORDER — ESCITALOPRAM OXALATE 10 MG/1
10 TABLET ORAL DAILY
Qty: 90 TABLET | Refills: 0 | Status: SHIPPED | OUTPATIENT
Start: 2020-07-02 | End: 2020-09-09 | Stop reason: SDUPTHER

## 2020-07-02 NOTE — TELEPHONE ENCOUNTER
Date of last refill:02/05/2020    Is there an Inspect on file:NA    Last appt date:06/09/2020     Next appt date:NONE      Additional information:

## 2020-07-06 RX ORDER — METOPROLOL SUCCINATE 25 MG/1
TABLET, EXTENDED RELEASE ORAL
Qty: 30 TABLET | Refills: 0 | Status: SHIPPED | OUTPATIENT
Start: 2020-07-06 | End: 2020-08-03

## 2020-07-16 RX ORDER — ISOSORBIDE MONONITRATE 30 MG/1
TABLET, EXTENDED RELEASE ORAL
Qty: 30 TABLET | Refills: 0 | OUTPATIENT
Start: 2020-07-16

## 2020-08-03 RX ORDER — METOPROLOL SUCCINATE 25 MG/1
TABLET, EXTENDED RELEASE ORAL
Qty: 30 TABLET | Refills: 0 | Status: SHIPPED | OUTPATIENT
Start: 2020-08-03 | End: 2020-09-01 | Stop reason: SDUPTHER

## 2020-08-03 RX ORDER — BUSPIRONE HYDROCHLORIDE 15 MG/1
15 TABLET ORAL 3 TIMES DAILY
Qty: 90 TABLET | Refills: 0 | Status: SHIPPED | OUTPATIENT
Start: 2020-08-03 | End: 2020-09-03

## 2020-08-03 NOTE — TELEPHONE ENCOUNTER
Date of last refill: 2-5-2020    Is there an Inspect on file: n/a    Last appt date: 2-5-2020     Next appt date: none       Additional information:  VM left on pt's phone # advising her to contact office to schedule appt.

## 2020-08-17 RX ORDER — HYDROCHLOROTHIAZIDE 12.5 MG/1
12.5 CAPSULE, GELATIN COATED ORAL DAILY
Qty: 30 CAPSULE | Refills: 0 | Status: SHIPPED | OUTPATIENT
Start: 2020-08-17 | End: 2020-09-09 | Stop reason: SDUPTHER

## 2020-08-17 RX ORDER — ISOSORBIDE MONONITRATE 30 MG/1
TABLET, EXTENDED RELEASE ORAL
Qty: 30 TABLET | Refills: 0 | Status: SHIPPED | OUTPATIENT
Start: 2020-08-17 | End: 2020-09-30

## 2020-08-17 RX ORDER — CLOPIDOGREL BISULFATE 75 MG/1
75 TABLET ORAL DAILY
Qty: 30 TABLET | Refills: 0 | Status: SHIPPED | OUTPATIENT
Start: 2020-08-17 | End: 2020-09-09 | Stop reason: SDUPTHER

## 2020-08-17 RX ORDER — AMLODIPINE BESYLATE 10 MG/1
10 TABLET ORAL DAILY
Qty: 30 TABLET | Refills: 0 | Status: SHIPPED | OUTPATIENT
Start: 2020-08-17 | End: 2020-09-09 | Stop reason: SDUPTHER

## 2020-08-17 NOTE — TELEPHONE ENCOUNTER
CALLED BOTH PHONE NUMBERS LISTED ON CHART. NO ANSWER. LVM'S ON BOTH PHONE NUMBERS FOR PATIENT TO SCHEDULE APPT. NOTE ALSO PLACED ON PT'S SIG.

## 2020-08-17 NOTE — TELEPHONE ENCOUNTER
Date of last refill:07/19/2020    Is there an Inspect on file: N/A    Last appt date:02/05/2020     Next appt date: N/A      Additional information:

## 2020-08-25 RX ORDER — LOSARTAN POTASSIUM 100 MG/1
TABLET ORAL
Qty: 90 TABLET | Refills: 0 | Status: SHIPPED | OUTPATIENT
Start: 2020-08-25 | End: 2020-09-09 | Stop reason: SDUPTHER

## 2020-08-25 NOTE — TELEPHONE ENCOUNTER
Date of last refill: 2-5-2020    Is there an Inspect on file: n/a    Last appt date: 2-5-2020     Next appt date: 9-1-2020      Additional information:

## 2020-08-31 ENCOUNTER — TELEPHONE (OUTPATIENT)
Dept: CARDIOLOGY | Facility: CLINIC | Age: 45
End: 2020-08-31

## 2020-09-01 RX ORDER — METOPROLOL SUCCINATE 25 MG/1
25 TABLET, EXTENDED RELEASE ORAL DAILY
Qty: 90 TABLET | Refills: 1 | Status: SHIPPED | OUTPATIENT
Start: 2020-09-01 | End: 2020-11-03 | Stop reason: SDUPTHER

## 2020-09-02 RX ORDER — ATORVASTATIN CALCIUM 10 MG/1
TABLET, FILM COATED ORAL
Qty: 90 TABLET | Refills: 0 | Status: SHIPPED | OUTPATIENT
Start: 2020-09-02 | End: 2020-11-03 | Stop reason: SDUPTHER

## 2020-09-03 RX ORDER — BUSPIRONE HYDROCHLORIDE 15 MG/1
TABLET ORAL
Qty: 90 TABLET | Refills: 0 | Status: SHIPPED | OUTPATIENT
Start: 2020-09-03 | End: 2020-09-09 | Stop reason: SDUPTHER

## 2020-09-03 NOTE — TELEPHONE ENCOUNTER
Date of last refill: 8-3-2020    Is there an Inspect on file: n/a    Last appt date: 2-5-2020     Next appt date: 9-9-2020      Additional information:

## 2020-09-09 ENCOUNTER — OFFICE VISIT (OUTPATIENT)
Dept: FAMILY MEDICINE CLINIC | Facility: CLINIC | Age: 45
End: 2020-09-09

## 2020-09-09 VITALS
TEMPERATURE: 98.4 F | BODY MASS INDEX: 47.95 KG/M2 | SYSTOLIC BLOOD PRESSURE: 151 MMHG | HEIGHT: 65 IN | OXYGEN SATURATION: 99 % | RESPIRATION RATE: 18 BRPM | HEART RATE: 70 BPM | WEIGHT: 287.8 LBS | DIASTOLIC BLOOD PRESSURE: 102 MMHG

## 2020-09-09 DIAGNOSIS — F32.A ANXIETY AND DEPRESSION: ICD-10-CM

## 2020-09-09 DIAGNOSIS — B19.20 HEPATITIS C VIRUS INFECTION WITHOUT HEPATIC COMA, UNSPECIFIED CHRONICITY: ICD-10-CM

## 2020-09-09 DIAGNOSIS — E78.49 OTHER HYPERLIPIDEMIA: ICD-10-CM

## 2020-09-09 DIAGNOSIS — Z23 IMMUNIZATION DUE: ICD-10-CM

## 2020-09-09 DIAGNOSIS — F41.9 ANXIETY AND DEPRESSION: ICD-10-CM

## 2020-09-09 DIAGNOSIS — F17.200 TOBACCO USE DISORDER: ICD-10-CM

## 2020-09-09 DIAGNOSIS — E61.1 IRON DEFICIENCY: ICD-10-CM

## 2020-09-09 DIAGNOSIS — H53.8 BLURRY VISION, RIGHT EYE: Primary | ICD-10-CM

## 2020-09-09 DIAGNOSIS — R73.9 HYPERGLYCEMIA: ICD-10-CM

## 2020-09-09 DIAGNOSIS — Z79.899 MEDICATION MANAGEMENT: ICD-10-CM

## 2020-09-09 DIAGNOSIS — I10 ESSENTIAL HYPERTENSION: ICD-10-CM

## 2020-09-09 PROBLEM — E66.9 OBESITY: Status: ACTIVE | Noted: 2018-03-13

## 2020-09-09 PROBLEM — E53.8 VITAMIN B12 DEFICIENCY: Status: ACTIVE | Noted: 2017-09-27

## 2020-09-09 PROBLEM — Z86.73 HISTORY OF STROKE: Status: ACTIVE | Noted: 2018-03-26

## 2020-09-09 PROBLEM — I25.10 CHRONIC CORONARY ARTERY DISEASE: Status: ACTIVE | Noted: 2019-01-30

## 2020-09-09 PROBLEM — R06.09 DYSPNEA ON EXERTION: Status: ACTIVE | Noted: 2019-03-05

## 2020-09-09 PROBLEM — E78.5 DYSLIPIDEMIA: Status: ACTIVE | Noted: 2019-03-05

## 2020-09-09 PROBLEM — G47.33 OBSTRUCTIVE SLEEP APNEA: Status: ACTIVE | Noted: 2017-11-29

## 2020-09-09 PROBLEM — D64.9 ANEMIA: Status: ACTIVE | Noted: 2018-08-01

## 2020-09-09 LAB
ALBUMIN SERPL-MCNC: 4 G/DL (ref 3.5–5.2)
ALBUMIN/GLOB SERPL: 1.1 G/DL
ALP SERPL-CCNC: 78 U/L (ref 39–117)
ALT SERPL W P-5'-P-CCNC: 73 U/L (ref 1–33)
ANION GAP SERPL CALCULATED.3IONS-SCNC: 8.9 MMOL/L (ref 5–15)
AST SERPL-CCNC: 55 U/L (ref 1–32)
BILIRUB SERPL-MCNC: 0.6 MG/DL (ref 0–1.2)
BUN SERPL-MCNC: 11 MG/DL (ref 6–20)
BUN/CREAT SERPL: 17.2 (ref 7–25)
CALCIUM SPEC-SCNC: 9.5 MG/DL (ref 8.6–10.5)
CHLORIDE SERPL-SCNC: 101 MMOL/L (ref 98–107)
CHOLEST SERPL-MCNC: 133 MG/DL (ref 0–200)
CO2 SERPL-SCNC: 28.1 MMOL/L (ref 22–29)
CREAT SERPL-MCNC: 0.64 MG/DL (ref 0.57–1)
DEPRECATED RDW RBC AUTO: 44.9 FL (ref 37–54)
ERYTHROCYTE [DISTWIDTH] IN BLOOD BY AUTOMATED COUNT: 12.7 % (ref 12.3–15.4)
FERRITIN SERPL-MCNC: 306 NG/ML (ref 13–150)
GFR SERPL CREATININE-BSD FRML MDRD: 101 ML/MIN/1.73
GLOBULIN UR ELPH-MCNC: 3.8 GM/DL
GLUCOSE SERPL-MCNC: 87 MG/DL (ref 65–99)
HCT VFR BLD AUTO: 40.9 % (ref 34–46.6)
HDLC SERPL-MCNC: 31 MG/DL (ref 40–60)
HGB BLD-MCNC: 14 G/DL (ref 12–15.9)
LDLC SERPL CALC-MCNC: 79 MG/DL (ref 0–100)
LDLC/HDLC SERPL: 2.54 {RATIO}
MCH RBC QN AUTO: 33.3 PG (ref 26.6–33)
MCHC RBC AUTO-ENTMCNC: 34.2 G/DL (ref 31.5–35.7)
MCV RBC AUTO: 97.1 FL (ref 79–97)
PLATELET # BLD AUTO: 203 10*3/MM3 (ref 140–450)
PMV BLD AUTO: 10.9 FL (ref 6–12)
POTASSIUM SERPL-SCNC: 4.5 MMOL/L (ref 3.5–5.2)
PROT SERPL-MCNC: 7.8 G/DL (ref 6–8.5)
RBC # BLD AUTO: 4.21 10*6/MM3 (ref 3.77–5.28)
SODIUM SERPL-SCNC: 138 MMOL/L (ref 136–145)
T4 FREE SERPL-MCNC: 1.29 NG/DL (ref 0.93–1.7)
TRIGL SERPL-MCNC: 117 MG/DL (ref 0–150)
TSH SERPL DL<=0.05 MIU/L-ACNC: 3.61 UIU/ML (ref 0.27–4.2)
VLDLC SERPL-MCNC: 23.4 MG/DL (ref 5–40)
WBC # BLD AUTO: 4.31 10*3/MM3 (ref 3.4–10.8)

## 2020-09-09 PROCEDURE — 80053 COMPREHEN METABOLIC PANEL: CPT | Performed by: FAMILY MEDICINE

## 2020-09-09 PROCEDURE — 84439 ASSAY OF FREE THYROXINE: CPT | Performed by: FAMILY MEDICINE

## 2020-09-09 PROCEDURE — 90732 PPSV23 VACC 2 YRS+ SUBQ/IM: CPT | Performed by: FAMILY MEDICINE

## 2020-09-09 PROCEDURE — 85027 COMPLETE CBC AUTOMATED: CPT | Performed by: FAMILY MEDICINE

## 2020-09-09 PROCEDURE — 80061 LIPID PANEL: CPT | Performed by: FAMILY MEDICINE

## 2020-09-09 PROCEDURE — 90471 IMMUNIZATION ADMIN: CPT | Performed by: FAMILY MEDICINE

## 2020-09-09 PROCEDURE — 99214 OFFICE O/P EST MOD 30 MIN: CPT | Performed by: FAMILY MEDICINE

## 2020-09-09 PROCEDURE — 87522 HEPATITIS C REVRS TRNSCRPJ: CPT | Performed by: FAMILY MEDICINE

## 2020-09-09 PROCEDURE — 84443 ASSAY THYROID STIM HORMONE: CPT | Performed by: FAMILY MEDICINE

## 2020-09-09 PROCEDURE — 82728 ASSAY OF FERRITIN: CPT | Performed by: FAMILY MEDICINE

## 2020-09-09 RX ORDER — CLOPIDOGREL BISULFATE 75 MG/1
75 TABLET ORAL DAILY
Qty: 90 TABLET | Refills: 1 | Status: SHIPPED | OUTPATIENT
Start: 2020-09-09 | End: 2020-11-03 | Stop reason: SDUPTHER

## 2020-09-09 RX ORDER — ESCITALOPRAM OXALATE 10 MG/1
10 TABLET ORAL DAILY
Qty: 90 TABLET | Refills: 1 | Status: SHIPPED | OUTPATIENT
Start: 2020-09-09 | End: 2021-03-29

## 2020-09-09 RX ORDER — LOSARTAN POTASSIUM 100 MG/1
100 TABLET ORAL DAILY
Qty: 90 TABLET | Refills: 1 | Status: SHIPPED | OUTPATIENT
Start: 2020-09-09 | End: 2020-11-03 | Stop reason: SDUPTHER

## 2020-09-09 RX ORDER — BUSPIRONE HYDROCHLORIDE 15 MG/1
15 TABLET ORAL 3 TIMES DAILY
Qty: 270 TABLET | Refills: 1 | Status: SHIPPED | OUTPATIENT
Start: 2020-09-09 | End: 2021-08-12

## 2020-09-09 RX ORDER — FERROUS SULFATE 325(65) MG
1 TABLET ORAL DAILY
Qty: 90 TABLET | Refills: 1 | Status: SHIPPED | OUTPATIENT
Start: 2020-09-09 | End: 2020-10-05 | Stop reason: ALTCHOICE

## 2020-09-09 RX ORDER — HYDROCHLOROTHIAZIDE 12.5 MG/1
12.5 CAPSULE, GELATIN COATED ORAL DAILY
Qty: 90 CAPSULE | Refills: 1 | Status: SHIPPED | OUTPATIENT
Start: 2020-09-09 | End: 2020-11-03 | Stop reason: SDUPTHER

## 2020-09-09 RX ORDER — AMLODIPINE BESYLATE 10 MG/1
10 TABLET ORAL DAILY
Qty: 90 TABLET | Refills: 1 | Status: SHIPPED | OUTPATIENT
Start: 2020-09-09 | End: 2020-11-03 | Stop reason: SDUPTHER

## 2020-09-09 RX ORDER — POTASSIUM CHLORIDE 1500 MG/1
20 TABLET, EXTENDED RELEASE ORAL DAILY
Qty: 90 TABLET | Refills: 1 | Status: SHIPPED | OUTPATIENT
Start: 2020-09-09 | End: 2020-11-03 | Stop reason: SDUPTHER

## 2020-09-11 LAB
HCV RNA SERPL NAA+PROBE-ACNC: NORMAL IU/ML
HCV RNA SERPL NAA+PROBE-LOG IU: 6.57 LOG10 IU/ML
TEST INFORMATION: NORMAL

## 2020-09-25 NOTE — PROGRESS NOTES
Subjective   Odalis Kaur is a 45 y.o. female.     Chief Complaint   Patient presents with   • Hepatitis C     Needs labs for Hep C from [facility].    • Hypertension     Does not check BP at home. Denies CP.    • Anxiety     associated w/ depression- states that she is doing well on Lexapro   • Eye Problem     vision issue with right eye after camping recently       History of Present Illness   Fu HTN, Anxiety  Need Hep c test for GI  Pt has had blurry vision after the week, thinks she contracted pink eye, no hx trauma  bp elevated today    The following portions of the patient's history were reviewed and updated as appropriate: allergies, current medications, past family history, past medical history, past social history, past surgical history and problem list.    Past Medical History:   Diagnosis Date   • Adnexal cyst     LESION   • B12 deficiency    • Coronary artery disease    • Depression    • Hepatitis C     TREATMENT PENDING GI   • Hyperlipidemia    • Hypertension    • Obesity    • MICHELE (obstructive sleep apnea)    • PAD (peripheral artery disease) (CMS/HCC)    • Paranasal sinus disease    • Personal history of DVT (deep vein thrombosis)    • Pulmonary nodule    • Snoring    • Stroke (CMS/HCC)     LEFT LIP AND SLIGHT FACIAL DROOP 100% OCCLUSION OF  TELMA   • Tobacco use    • UTI (urinary tract infection)        Past Surgical History:   Procedure Laterality Date   •  SECTION     • HYSTEROSCOPY ENDOMETRIAL ABLATION     • TONSILLECTOMY     • TUBAL ABDOMINAL LIGATION Bilateral        Family History   Problem Relation Age of Onset   • Hypertension Mother    • Cancer Maternal Grandmother    • Bone cancer Maternal Grandmother    • Brain cancer Maternal Grandmother    • Prostate cancer Maternal Grandfather        Review of Systems   Constitutional: Negative for chills, fatigue, fever, unexpected weight gain and unexpected weight loss.   HENT: Negative for congestion, sinus pressure and sore throat.          Normal smell/taste   Eyes: Positive for blurred vision (right eye) and redness. Negative for visual disturbance.   Respiratory: Negative for cough, shortness of breath and wheezing.    Cardiovascular: Negative for chest pain, palpitations and leg swelling.   Gastrointestinal: Negative for abdominal pain, constipation, diarrhea, nausea, vomiting, GERD and indigestion.   Musculoskeletal: Negative for arthralgias, back pain, gait problem, joint swelling and neck pain.   Skin: Negative for rash, skin lesions and bruise.   Allergic/Immunologic: Negative for environmental allergies.   Neurological: Negative for dizziness, tremors, syncope, weakness, light-headedness, headache and memory problem.   Psychiatric/Behavioral: Negative for sleep disturbance, depressed mood and stress. The patient is not nervous/anxious.        Objective   Physical Exam  Vitals signs and nursing note reviewed.   Constitutional:       General: She is not in acute distress.     Appearance: She is well-developed. She is obese. She is not diaphoretic.   HENT:      Head: Normocephalic and atraumatic.      Right Ear: External ear normal.      Left Ear: External ear normal.      Nose: Nose normal.   Eyes:      Pupils: Pupils are equal, round, and reactive to light.      Comments: Right eye with conjunctival injection   Neck:      Musculoskeletal: Normal range of motion and neck supple.      Thyroid: No thyromegaly.   Cardiovascular:      Rate and Rhythm: Normal rate and regular rhythm.      Heart sounds: Normal heart sounds. No murmur. No friction rub. No gallop.    Pulmonary:      Effort: Pulmonary effort is normal.      Breath sounds: No wheezing.   Abdominal:      Palpations: Abdomen is soft.      Tenderness: There is no abdominal tenderness.   Musculoskeletal: Normal range of motion.         General: No tenderness or deformity.   Lymphadenopathy:      Cervical: No cervical adenopathy.   Skin:     General: Skin is warm and dry.      Findings: No  rash.   Neurological:      Mental Status: She is alert and oriented to person, place, and time.      Cranial Nerves: No cranial nerve deficit.   Psychiatric:         Behavior: Behavior normal.         Thought Content: Thought content normal.         Judgment: Judgment normal.       Vitals:    09/09/20 1207   BP: (!) 151/102   Pulse: 70   Resp: 18   Temp: 98.4 °F (36.9 °C)   SpO2: 99%     Body mass index is 47.89 kg/m².    LDL Cholesterol    Date Value Ref Range Status   09/09/2020 79 0 - 100 mg/dL Final   02/05/2020 82 0 - 100 mg/dL Final     TSH   Date Value Ref Range Status   09/09/2020 3.610 0.270 - 4.200 uIU/mL Final     Results for orders placed or performed in visit on 09/09/20   TSH+Free T4    Specimen: Arm, Right; Blood   Result Value Ref Range    TSH 3.610 0.270 - 4.200 uIU/mL    Free T4 1.29 0.93 - 1.70 ng/dL   Hepatitis C RNA, Quantitative, PCR (graph)    Specimen: Arm, Right; Blood   Result Value Ref Range    Hepatitis C Quantitation [id number] IU/mL    HCV log10 6.572 log10 IU/mL    Test Information Comment    CBC (No Diff)    Specimen: Arm, Right; Blood   Result Value Ref Range    WBC 4.31 3.40 - 10.80 10*3/mm3    RBC 4.21 3.77 - 5.28 10*6/mm3    Hemoglobin 14.0 12.0 - 15.9 g/dL    Hematocrit 40.9 34.0 - 46.6 %    MCV 97.1 (H) 79.0 - 97.0 fL    MCH 33.3 (H) 26.6 - 33.0 pg    MCHC 34.2 31.5 - 35.7 g/dL    RDW 12.7 12.3 - 15.4 %    RDW-SD 44.9 37.0 - 54.0 fl    MPV 10.9 6.0 - 12.0 fL    Platelets 203 140 - 450 10*3/mm3   Ferritin    Specimen: Arm, Right; Blood   Result Value Ref Range    Ferritin 306.00 (H) 13.00 - 150.00 ng/mL   Lipid Panel    Specimen: Arm, Right; Blood   Result Value Ref Range    Total Cholesterol 133 0 - 200 mg/dL    Triglycerides 117 0 - 150 mg/dL    HDL Cholesterol 31 (L) 40 - 60 mg/dL    LDL Cholesterol  79 0 - 100 mg/dL    VLDL Cholesterol 23.4 5 - 40 mg/dL    LDL/HDL Ratio 2.54    Comprehensive Metabolic Panel    Specimen: Arm, Right; Blood   Result Value Ref Range    Glucose 87 65  - 99 mg/dL    BUN 11 6 - 20 mg/dL    Creatinine 0.64 0.57 - 1.00 mg/dL    Sodium 138 136 - 145 mmol/L    Potassium 4.5 3.5 - 5.2 mmol/L    Chloride 101 98 - 107 mmol/L    CO2 28.1 22.0 - 29.0 mmol/L    Calcium 9.5 8.6 - 10.5 mg/dL    Total Protein 7.8 6.0 - 8.5 g/dL    Albumin 4.00 3.50 - 5.20 g/dL    ALT (SGPT) 73 (H) 1 - 33 U/L    AST (SGOT) 55 (H) 1 - 32 U/L    Alkaline Phosphatase 78 39 - 117 U/L    Total Bilirubin 0.6 0.0 - 1.2 mg/dL    eGFR Non African Amer 101 >60 mL/min/1.73    Globulin 3.8 gm/dL    A/G Ratio 1.1 g/dL    BUN/Creatinine Ratio 17.2 7.0 - 25.0    Anion Gap 8.9 5.0 - 15.0 mmol/L   Results for orders placed or performed in visit on 02/05/20   CBC Auto Differential    Specimen: Arm, Right; Blood   Result Value Ref Range    WBC 5.18 3.40 - 10.80 10*3/mm3    RBC 4.15 3.77 - 5.28 10*6/mm3    Hemoglobin 13.9 12.0 - 15.9 g/dL    Hematocrit 40.2 34.0 - 46.6 %    MCV 96.9 79.0 - 97.0 fL    MCH 33.5 (H) 26.6 - 33.0 pg    MCHC 34.6 31.5 - 35.7 g/dL    RDW 12.9 12.3 - 15.4 %    RDW-SD 45.6 37.0 - 54.0 fl    MPV 11.0 6.0 - 12.0 fL    Platelets 246 140 - 450 10*3/mm3    Neutrophil % 55.8 42.7 - 76.0 %    Lymphocyte % 32.8 19.6 - 45.3 %    Monocyte % 6.9 5.0 - 12.0 %    Eosinophil % 3.3 0.3 - 6.2 %    Basophil % 1.0 0.0 - 1.5 %    Immature Grans % 0.2 0.0 - 0.5 %    Neutrophils, Absolute 2.89 1.70 - 7.00 10*3/mm3    Lymphocytes, Absolute 1.70 0.70 - 3.10 10*3/mm3    Monocytes, Absolute 0.36 0.10 - 0.90 10*3/mm3    Eosinophils, Absolute 0.17 0.00 - 0.40 10*3/mm3    Basophils, Absolute 0.05 0.00 - 0.20 10*3/mm3    Immature Grans, Absolute 0.01 0.00 - 0.05 10*3/mm3    nRBC 0.0 0.0 - 0.2 /100 WBC   Iron Profile    Specimen: Arm, Right; Blood   Result Value Ref Range    Iron 107 37 - 145 mcg/dL    Iron Saturation 27 20 - 50 %    Transferrin 265 200 - 360 mg/dL    TIBC 395 298 - 536 mcg/dL   Vitamin D 25 hydroxy    Specimen: Arm, Right; Blood   Result Value Ref Range    25 Hydroxy, Vitamin D 16.8 (L) 30.0 - 100.0  ng/ml   Ferritin    Specimen: Arm, Right; Blood   Result Value Ref Range    Ferritin 207.00 (H) 13.00 - 150.00 ng/mL   Vitamin B12    Specimen: Arm, Right; Blood   Result Value Ref Range    Vitamin B-12 543 211 - 946 pg/mL   Lipid Panel    Specimen: Arm, Right; Blood   Result Value Ref Range    Total Cholesterol 135 0 - 200 mg/dL    Triglycerides 109 0 - 150 mg/dL    HDL Cholesterol 31 (L) 40 - 60 mg/dL    LDL Cholesterol  82 0 - 100 mg/dL    VLDL Cholesterol 21.8 5 - 40 mg/dL    LDL/HDL Ratio 2.65    Comprehensive Metabolic Panel    Specimen: Arm, Right; Blood   Result Value Ref Range    Glucose 73 65 - 99 mg/dL    BUN 13 6 - 20 mg/dL    Creatinine 0.74 0.57 - 1.00 mg/dL    Sodium 138 136 - 145 mmol/L    Potassium 3.9 3.5 - 5.2 mmol/L    Chloride 98 98 - 107 mmol/L    CO2 24.7 22.0 - 29.0 mmol/L    Calcium 9.4 8.6 - 10.5 mg/dL    Total Protein 7.7 6.0 - 8.5 g/dL    Albumin 4.00 3.50 - 5.20 g/dL    ALT (SGPT) 37 (H) 1 - 33 U/L    AST (SGOT) 34 (H) 1 - 32 U/L    Alkaline Phosphatase 69 39 - 117 U/L    Total Bilirubin 0.6 0.2 - 1.2 mg/dL    eGFR Non African Amer 86 >60 mL/min/1.73    Globulin 3.7 gm/dL    A/G Ratio 1.1 g/dL    BUN/Creatinine Ratio 17.6 7.0 - 25.0    Anion Gap 15.3 (H) 5.0 - 15.0 mmol/L       Diagnoses and all orders for this visit:    1. Blurry vision, right eye (Primary)  -     Ambulatory Referral for Diabetic Eye Exam-Ophthalmology    2. Other hyperlipidemia  -     Lipid Panel; Future  -     Lipid Panel    3. Medication management    4. Hyperglycemia    5. Essential hypertension  -     Comprehensive Metabolic Panel  -     CBC (No Diff); Future  -     TSH+Free T4  -     CBC (No Diff)    6. Tobacco use disorder    7. Anxiety and depression  -     Comprehensive Metabolic Panel  -     CBC (No Diff); Future  -     CBC (No Diff)    8. Iron deficiency  -     Ferritin    9. Immunization due  -     pneumococcal polysaccharide 23-valent (PNEUMOVAX-23) vaccine 0.5 mL    10. Hepatitis C virus infection without  hepatic coma, unspecified chronicity  -     Hepatitis C RNA, Quantitative, PCR (graph); Future  -     Hepatitis C RNA, Quantitative, PCR (graph)    Other orders  -     Discontinue: amLODIPine (NORVASC) 10 MG tablet; Take 1 tablet by mouth Daily. .  Dispense: 90 tablet; Refill: 1  -     busPIRone (BUSPAR) 15 MG tablet; Take 1 tablet by mouth 3 (Three) Times a Day.  Dispense: 270 tablet; Refill: 1  -     Discontinue: clopidogrel (PLAVIX) 75 MG tablet; Take 1 tablet by mouth Daily. MUST SCHEDULE APPT FOR FURTHER REFILLS.  Dispense: 90 tablet; Refill: 1  -     escitalopram (LEXAPRO) 10 MG tablet; Take 1 tablet by mouth Daily.  Dispense: 90 tablet; Refill: 1  -     Discontinue: hydroCHLOROthiazide (MICROZIDE) 12.5 MG capsule; Take 1 capsule by mouth Daily. MUST SCHEDULE APPT FOR FURTHER REFILLS.  Dispense: 90 capsule; Refill: 1  -     Discontinue: KLOR-CON 20 MEQ CR tablet; Take 1 tablet by mouth Daily.  Dispense: 90 tablet; Refill: 1  -     Discontinue: losartan (COZAAR) 100 MG tablet; Take 1 tablet by mouth Daily.  Dispense: 90 tablet; Refill: 1  -     Discontinue: ferrous sulfate 325 (65 FE) MG tablet; Take 1 tablet by mouth Daily.  Dispense: 90 tablet; Refill: 1      Fu labs   refill meds  Refer opthalmology

## 2020-09-27 DIAGNOSIS — B19.20 HEPATITIS C VIRUS INFECTION WITHOUT HEPATIC COMA, UNSPECIFIED CHRONICITY: Primary | ICD-10-CM

## 2020-09-30 RX ORDER — ISOSORBIDE MONONITRATE 30 MG/1
TABLET, EXTENDED RELEASE ORAL
Qty: 90 TABLET | Refills: 0 | Status: SHIPPED | OUTPATIENT
Start: 2020-09-30 | End: 2020-11-03

## 2020-10-05 ENCOUNTER — TELEPHONE (OUTPATIENT)
Dept: FAMILY MEDICINE CLINIC | Facility: CLINIC | Age: 45
End: 2020-10-05

## 2020-10-05 NOTE — TELEPHONE ENCOUNTER
----- Message from Ashley Zarate MD sent at 9/27/2020  6:45 PM EDT -----  Iron level too high, needs to stop iron  Hep c levles elevated, will refer to GI

## 2020-10-05 NOTE — TELEPHONE ENCOUNTER
CALLED PATIENT. PATIENT'S IDENTITY VERIFIED. ADVISED HER OF PROVIDER'S RESPONSE. SHE VOICED UNDERSTANDING. ALREADY SEES DR. KEEN FOR GI. STATES THAT SHE WILL CALL AND MAKE F/UP APPT WITH HIM. LABS SENT TO DR. KEEN AS REQUESTED BY PATIENT. MED LIST UPDATED.     HOWEVER, PATIENT INQUIRED ABOUT HER VIT D. NO VIT D RESULTED/ORDERED THIS TIME. WAS NOT ON VIT D AT THE TIME OF THE OFFICE VISIT. WANTS TO KNOW WHAT STRENGTH SHE NEEDS TO BE ON IF YOU WANT HER TO BE? PLEASE ADVISE.

## 2020-10-06 RX ORDER — ACETAMINOPHEN 160 MG
2000 TABLET,DISINTEGRATING ORAL DAILY
COMMUNITY

## 2020-10-06 NOTE — TELEPHONE ENCOUNTER
Called patient. No answer. Per HIPPA, may leave detailed VM. VM left advising pt that she may start on Vit D3 2,000 IU daily. She was encouraged to call office with questions or concerns. Medication list updated.

## 2020-11-03 ENCOUNTER — OFFICE VISIT (OUTPATIENT)
Dept: CARDIOLOGY | Facility: CLINIC | Age: 45
End: 2020-11-03

## 2020-11-03 ENCOUNTER — FLU SHOT (OUTPATIENT)
Dept: FAMILY MEDICINE CLINIC | Facility: CLINIC | Age: 45
End: 2020-11-03

## 2020-11-03 VITALS
DIASTOLIC BLOOD PRESSURE: 88 MMHG | HEIGHT: 65 IN | BODY MASS INDEX: 47.98 KG/M2 | OXYGEN SATURATION: 97 % | SYSTOLIC BLOOD PRESSURE: 128 MMHG | HEART RATE: 84 BPM | WEIGHT: 288 LBS

## 2020-11-03 DIAGNOSIS — E78.5 DYSLIPIDEMIA: Primary | ICD-10-CM

## 2020-11-03 DIAGNOSIS — R06.09 DYSPNEA ON EXERTION: ICD-10-CM

## 2020-11-03 DIAGNOSIS — I10 ESSENTIAL HYPERTENSION: ICD-10-CM

## 2020-11-03 DIAGNOSIS — Z23 FLU VACCINE NEED: Primary | ICD-10-CM

## 2020-11-03 DIAGNOSIS — R79.89 ELEVATED LFTS: ICD-10-CM

## 2020-11-03 DIAGNOSIS — E66.01 MORBID OBESITY WITH BMI OF 45.0-49.9, ADULT (HCC): ICD-10-CM

## 2020-11-03 DIAGNOSIS — I25.10 CORONARY ARTERY DISEASE INVOLVING NATIVE CORONARY ARTERY OF NATIVE HEART WITHOUT ANGINA PECTORIS: ICD-10-CM

## 2020-11-03 PROCEDURE — 99214 OFFICE O/P EST MOD 30 MIN: CPT | Performed by: INTERNAL MEDICINE

## 2020-11-03 PROCEDURE — 90471 IMMUNIZATION ADMIN: CPT | Performed by: NURSE PRACTITIONER

## 2020-11-03 PROCEDURE — 90686 IIV4 VACC NO PRSV 0.5 ML IM: CPT | Performed by: NURSE PRACTITIONER

## 2020-11-03 PROCEDURE — 93000 ELECTROCARDIOGRAM COMPLETE: CPT | Performed by: INTERNAL MEDICINE

## 2020-11-03 RX ORDER — POTASSIUM CHLORIDE 1500 MG/1
20 TABLET, EXTENDED RELEASE ORAL DAILY
Qty: 90 TABLET | Refills: 5 | Status: SHIPPED | OUTPATIENT
Start: 2020-11-03 | End: 2021-12-13

## 2020-11-03 RX ORDER — LOSARTAN POTASSIUM 100 MG/1
100 TABLET ORAL DAILY
Qty: 90 TABLET | Refills: 5 | Status: SHIPPED | OUTPATIENT
Start: 2020-11-03 | End: 2020-12-07 | Stop reason: SDUPTHER

## 2020-11-03 RX ORDER — METOPROLOL SUCCINATE 25 MG/1
25 TABLET, EXTENDED RELEASE ORAL DAILY
Qty: 90 TABLET | Refills: 5 | Status: SHIPPED | OUTPATIENT
Start: 2020-11-03 | End: 2022-01-04 | Stop reason: SDUPTHER

## 2020-11-03 RX ORDER — ATORVASTATIN CALCIUM 10 MG/1
10 TABLET, FILM COATED ORAL
Qty: 90 TABLET | Refills: 5 | Status: SHIPPED | OUTPATIENT
Start: 2020-11-03 | End: 2022-01-13

## 2020-11-03 RX ORDER — AMLODIPINE BESYLATE 10 MG/1
10 TABLET ORAL DAILY
Qty: 90 TABLET | Refills: 5 | Status: SHIPPED | OUTPATIENT
Start: 2020-11-03 | End: 2021-11-15

## 2020-11-03 RX ORDER — HYDROCHLOROTHIAZIDE 12.5 MG/1
12.5 CAPSULE, GELATIN COATED ORAL DAILY
Qty: 90 CAPSULE | Refills: 5 | Status: SHIPPED | OUTPATIENT
Start: 2020-11-03 | End: 2021-09-28 | Stop reason: SDUPTHER

## 2020-11-03 RX ORDER — CLOPIDOGREL BISULFATE 75 MG/1
75 TABLET ORAL DAILY
Qty: 90 TABLET | Refills: 5 | Status: SHIPPED | OUTPATIENT
Start: 2020-11-03 | End: 2021-11-16

## 2020-11-03 NOTE — PROGRESS NOTES
Subjective:     Encounter Date:11/03/2020      Patient ID: Odalis Kaur is a 45 y.o. female.    Chief Complaint : Follow-up for CAD, hypertension, hyperlipidemia, peripheral arterial disease  History of Present Illness         This is a 45-year-old with PMH of    # CAD, cath 4/10/19 , ostial LAD 50% and 70% D1  #  hyperlipidemia, hypertension  # PAD, 100% TELMA  # C V A / Stroke  RMCA / watershed CVA w/LUE, left lip & slight facial droop 100% occlusion of TELMA  #  history of DVT  #   hep C,B12 deficiency,depression  #   MICHELE,pulmonary nodule,  #  tubal ligation, tonsillectomy  #  cigarette smoker  #  positive family history of hypertension and mother and cancer and grand parents  #  allergy/intolerance to iron infusions     here for follow-up..  Patient denies any chest pain or shortness of breath. patient is not very active because of previous CVA and left hemiplegia. her leg still drags and she cannot walk fast and her left arm strength is not completely back to normal.  Patient has dyspnea on exertion class I relieved with rest.. patient is not on statins. her labs from 9/14/16 revealed cholesterol 136 HDL low at 22 LDL not at goal at 85. Labs from 9/9/2020 reveal cholesterol 133 triglycerides 178 HDL low at 31 LDL 79, CMP with elevated ALT at 73, AST at 55.  Patient's ideal blood pressure is 128/88, heart rate 84, O2 sat of 97% on room air, BMI of 47.93        ASSESSMENT:  #  CAD  #  history of CVA, 100% carotid occlusion  #  hypertension, dyslipidemia, obesity, cigarette smoker  #. Elevated LFTs, history of hep C    PLAN:  Reviewed EKG results with patient  Continue medical management with aspirin, amlodipine, atorvastatin, clopidogrel,  losartan, metoprolol, hydrochlorothiazide and KCl as tolerated.  Risk benefits alternatives explained  Counseled on smoking cessation.  Counseled on weight loss diet and exercise   Will  continue her on statins, risk benefits alternatives explained.   Will followup and  check lipid profile CMP and CK, since patient has liver disease and hep C Will follow LFTs with CMP before next visit. risk benefits alternatives explained  Patient is following with GI Dr. Burden for hep C treatment and is awaiting insurance approval before she can start hep C therapy.  Patient's dyspnea on exertion is probably multifactorial with deconditioning obesity hypertension.  Will continue to monitor since its mild.  Will discontinue isosorbide.  Refill medications.      Assessment:          Diagnosis Plan   1. Dyslipidemia  Comprehensive Metabolic Panel    Lipid Panel   2. Coronary artery disease involving native coronary artery of native heart without angina pectoris  Comprehensive Metabolic Panel    Lipid Panel   3. Essential hypertension  Comprehensive Metabolic Panel    Lipid Panel   4. Elevated LFTs  Comprehensive Metabolic Panel    Lipid Panel   5. Morbid obesity with BMI of 45.0-49.9, adult (CMS/HCC)  Comprehensive Metabolic Panel    Lipid Panel          Plan:         Past Medical History:  Past Medical History:   Diagnosis Date   • Adnexal cyst     LESION   • B12 deficiency    • Coronary artery disease    • Depression    • Hepatitis C     TREATMENT PENDING GI   • Hyperlipidemia    • Hypertension    • Obesity    • MICHELE (obstructive sleep apnea)    • PAD (peripheral artery disease) (CMS/HCC)    • Paranasal sinus disease    • Personal history of DVT (deep vein thrombosis)    • Pulmonary nodule    • Snoring    • Stroke (CMS/HCC)     LEFT LIP AND SLIGHT FACIAL DROOP 100% OCCLUSION OF  TELMA   • Tobacco use    • UTI (urinary tract infection)      Past Surgical History:  Past Surgical History:   Procedure Laterality Date   •  SECTION     • HYSTEROSCOPY ENDOMETRIAL ABLATION     • TONSILLECTOMY     • TUBAL ABDOMINAL LIGATION Bilateral       Allergies:  Allergies   Allergen Reactions   • Iron Hives     INFUSION AND INJECTIONS ONLY      Home Meds:  Current Meds:     Current Outpatient Medications:   •   amLODIPine (NORVASC) 10 MG tablet, Take 1 tablet by mouth Daily. ., Disp: 90 tablet, Rfl: 1  •  aspirin (ASPIRIN 81) 81 MG chewable tablet, ASPIRIN 81 CHEW, Disp: , Rfl:   •  atorvastatin (LIPITOR) 10 MG tablet, TAKE 1 TABLET BY MOUTH EVERYDAY AT BEDTIME, Disp: 90 tablet, Rfl: 0  •  busPIRone (BUSPAR) 15 MG tablet, Take 1 tablet by mouth 3 (Three) Times a Day., Disp: 270 tablet, Rfl: 1  •  Cholecalciferol (Vitamin D3) 50 MCG (2000 UT) capsule, Take 2,000 Units by mouth Daily., Disp: , Rfl:   •  clopidogrel (PLAVIX) 75 MG tablet, Take 1 tablet by mouth Daily. MUST SCHEDULE APPT FOR FURTHER REFILLS., Disp: 90 tablet, Rfl: 1  •  escitalopram (LEXAPRO) 10 MG tablet, Take 1 tablet by mouth Daily., Disp: 90 tablet, Rfl: 1  •  hydroCHLOROthiazide (MICROZIDE) 12.5 MG capsule, Take 1 capsule by mouth Daily. MUST SCHEDULE APPT FOR FURTHER REFILLS., Disp: 90 capsule, Rfl: 1  •  isosorbide mononitrate (IMDUR) 30 MG 24 hr tablet, TAKE 1 TABLET BY MOUTH EVERY DAY, Disp: 90 tablet, Rfl: 0  •  KLOR-CON 20 MEQ CR tablet, Take 1 tablet by mouth Daily., Disp: 90 tablet, Rfl: 1  •  losartan (COZAAR) 100 MG tablet, Take 1 tablet by mouth Daily., Disp: 90 tablet, Rfl: 1  •  metoprolol succinate XL (TOPROL-XL) 25 MG 24 hr tablet, Take 1 tablet by mouth Daily., Disp: 90 tablet, Rfl: 1  Social History:   Social History     Tobacco Use   • Smoking status: Current Every Day Smoker     Packs/day: 0.50   • Smokeless tobacco: Never Used   Substance Use Topics   • Alcohol use: No     Frequency: Never      Family History:  Family History   Problem Relation Age of Onset   • Hypertension Mother    • Cancer Maternal Grandmother    • Bone cancer Maternal Grandmother    • Brain cancer Maternal Grandmother    • Prostate cancer Maternal Grandfather         The following portions of the patient's history were reviewed and updated as appropriate: allergies, current medications, past family history, past medical history, past social history, past surgical  "history and problem list.      Review of Systems   Constitution: Negative for fever and malaise/fatigue.   HENT: Negative for congestion and hearing loss.    Eyes: Negative for double vision and visual disturbance.   Cardiovascular: Positive for dyspnea on exertion. Negative for chest pain, claudication, leg swelling and syncope.   Respiratory: Negative for cough and shortness of breath.    Endocrine: Negative for cold intolerance.   Skin: Negative for color change and rash.   Musculoskeletal: Negative for arthritis and joint pain.   Gastrointestinal: Negative for abdominal pain and heartburn.   Genitourinary: Negative for hematuria.   Neurological: Negative for excessive daytime sleepiness and dizziness.   Psychiatric/Behavioral: Negative for depression. The patient is not nervous/anxious.    All other systems reviewed and are negative.          ECG 12 Lead    Date/Time: 11/3/2020 11:05 AM  Performed by: Shay Muller MD  Authorized by: Shay Muller MD   Comparison: compared with previous ECG from 3/5/2019  Comparison to previous ECG: EKG done today reviewed by me shows sinus rhythm at the rate of 71 bpm with no acute ST-T changes, no new change compared to EKG from 3/5/2019                 Objective:     Physical Exam  /88   Pulse 84   Ht 165.1 cm (65\")   Wt 131 kg (288 lb)   SpO2 97%   BMI 47.93 kg/m²   General:  Appears in no acute distress  Eyes: Sclera is anicteric,  conjunctiva is clear   HEENT:  No JVD. Thyroid not visibly enlarged. No mucosal pallor or cyanosis  Respiratory: Respirations regular and unlabored at rest.  Bilaterally good breath sounds, with good air entry in all fields. No crackles, rubs or wheezes auscultated  Cardiovascular: S1,S2 Regular rate and rhythm. No murmur, rub or gallop auscultated. No pretibial pitting edema  Gastrointestinal: Abdomen soft, flat, non tender. Bowel sounds present.   Musculoskeletal:  No abnormal movements  Extremities: No " digital clubbing or cyanosis  Skin: Color pink. Skin warm and dry to touch. No rashes  No xanthoma  Neuro: Alert and awake, no lateralizing deficits appreciated    Lab Reviewed:

## 2020-11-30 RX ORDER — LOSARTAN POTASSIUM 100 MG/1
TABLET ORAL
Qty: 90 TABLET | Refills: 0 | OUTPATIENT
Start: 2020-11-30

## 2020-11-30 NOTE — TELEPHONE ENCOUNTER
Date of last refill:     Is there an Inspect on file: N/A    Last appt date: 9-9-2020     Next appt date: 12-9-2020      Additional information:  DR. SCALES HAS ALREADY RESPONDED TO THIS REQUEST FOR PT ON 11-3-2020

## 2020-12-07 RX ORDER — LOSARTAN POTASSIUM 100 MG/1
100 TABLET ORAL DAILY
Qty: 90 TABLET | Refills: 5 | Status: SHIPPED | OUTPATIENT
Start: 2020-12-07 | End: 2022-02-22

## 2020-12-07 RX ORDER — LOSARTAN POTASSIUM 100 MG/1
TABLET ORAL
Qty: 90 TABLET | Refills: 0 | OUTPATIENT
Start: 2020-12-07

## 2020-12-07 NOTE — TELEPHONE ENCOUNTER
Date of last refill:11/03/2020    Is there an Inspect on file:N/A    Last appt date:09/09/2020     Next appt date:12/09/2020      Additional information:

## 2020-12-15 RX ORDER — ISOSORBIDE MONONITRATE 30 MG/1
TABLET, EXTENDED RELEASE ORAL
Qty: 90 TABLET | Refills: 1 | OUTPATIENT
Start: 2020-12-15

## 2021-01-13 RX ORDER — ISOSORBIDE MONONITRATE 30 MG/1
TABLET, EXTENDED RELEASE ORAL
Qty: 90 TABLET | Refills: 0 | OUTPATIENT
Start: 2021-01-13

## 2021-02-16 RX ORDER — ISOSORBIDE MONONITRATE 30 MG/1
TABLET, EXTENDED RELEASE ORAL
Qty: 90 TABLET | Refills: 3 | OUTPATIENT
Start: 2021-02-16

## 2021-02-23 RX ORDER — ISOSORBIDE MONONITRATE 30 MG/1
TABLET, EXTENDED RELEASE ORAL
Qty: 90 TABLET | Refills: 0 | OUTPATIENT
Start: 2021-02-23

## 2021-03-29 RX ORDER — ESCITALOPRAM OXALATE 10 MG/1
TABLET ORAL
Qty: 45 TABLET | Refills: 0 | Status: SHIPPED | OUTPATIENT
Start: 2021-03-29 | End: 2021-07-27

## 2021-07-26 NOTE — TELEPHONE ENCOUNTER
Date of last refill:03/29/21    Is there an Inspect on file:NA     Last Appointment:09/09/2020    Next Appointment: 07/28/2021       Additional information:

## 2021-07-27 RX ORDER — ESCITALOPRAM OXALATE 10 MG/1
TABLET ORAL
Qty: 30 TABLET | Refills: 0 | Status: SHIPPED | OUTPATIENT
Start: 2021-07-27 | End: 2021-08-27

## 2021-07-28 ENCOUNTER — OFFICE VISIT (OUTPATIENT)
Dept: FAMILY MEDICINE CLINIC | Facility: CLINIC | Age: 46
End: 2021-07-28

## 2021-07-28 VITALS
SYSTOLIC BLOOD PRESSURE: 121 MMHG | DIASTOLIC BLOOD PRESSURE: 82 MMHG | TEMPERATURE: 98.6 F | HEART RATE: 86 BPM | BODY MASS INDEX: 47.65 KG/M2 | OXYGEN SATURATION: 97 % | RESPIRATION RATE: 18 BRPM | WEIGHT: 286 LBS | HEIGHT: 65 IN

## 2021-07-28 DIAGNOSIS — B18.2 CHRONIC HEPATITIS C WITHOUT HEPATIC COMA (HCC): Primary | ICD-10-CM

## 2021-07-28 DIAGNOSIS — F41.9 ANXIETY: ICD-10-CM

## 2021-07-28 DIAGNOSIS — Z79.899 MEDICATION MANAGEMENT: ICD-10-CM

## 2021-07-28 DIAGNOSIS — I10 ESSENTIAL HYPERTENSION: ICD-10-CM

## 2021-07-28 DIAGNOSIS — R79.89 ELEVATED LFTS: ICD-10-CM

## 2021-07-28 DIAGNOSIS — K58.2 IRRITABLE BOWEL SYNDROME WITH BOTH CONSTIPATION AND DIARRHEA: ICD-10-CM

## 2021-07-28 DIAGNOSIS — R63.5 WEIGHT GAIN: ICD-10-CM

## 2021-07-28 DIAGNOSIS — F17.200 TOBACCO USE DISORDER: ICD-10-CM

## 2021-07-28 DIAGNOSIS — Z87.42 HISTORY OF MENORRHAGIA: ICD-10-CM

## 2021-07-28 DIAGNOSIS — E78.5 DYSLIPIDEMIA: ICD-10-CM

## 2021-07-28 DIAGNOSIS — F32.89 OTHER DEPRESSION: ICD-10-CM

## 2021-07-28 DIAGNOSIS — G47.33 OSA (OBSTRUCTIVE SLEEP APNEA): ICD-10-CM

## 2021-07-28 DIAGNOSIS — I25.10 CORONARY ARTERY DISEASE INVOLVING NATIVE CORONARY ARTERY OF NATIVE HEART WITHOUT ANGINA PECTORIS: ICD-10-CM

## 2021-07-28 DIAGNOSIS — E66.01 MORBID OBESITY WITH BMI OF 45.0-49.9, ADULT (HCC): ICD-10-CM

## 2021-07-28 PROCEDURE — 99214 OFFICE O/P EST MOD 30 MIN: CPT | Performed by: FAMILY MEDICINE

## 2021-07-28 PROCEDURE — 84443 ASSAY THYROID STIM HORMONE: CPT | Performed by: FAMILY MEDICINE

## 2021-07-28 PROCEDURE — 84439 ASSAY OF FREE THYROXINE: CPT | Performed by: FAMILY MEDICINE

## 2021-07-28 PROCEDURE — 80053 COMPREHEN METABOLIC PANEL: CPT | Performed by: FAMILY MEDICINE

## 2021-07-28 PROCEDURE — 87522 HEPATITIS C REVRS TRNSCRPJ: CPT | Performed by: FAMILY MEDICINE

## 2021-07-28 PROCEDURE — 80061 LIPID PANEL: CPT | Performed by: FAMILY MEDICINE

## 2021-07-29 LAB
ALBUMIN SERPL-MCNC: 4 G/DL (ref 3.5–5.2)
ALBUMIN/GLOB SERPL: 1 G/DL
ALP SERPL-CCNC: 88 U/L (ref 39–117)
ALT SERPL W P-5'-P-CCNC: 24 U/L (ref 1–33)
ANION GAP SERPL CALCULATED.3IONS-SCNC: 10.7 MMOL/L (ref 5–15)
AST SERPL-CCNC: 27 U/L (ref 1–32)
BILIRUB SERPL-MCNC: 0.5 MG/DL (ref 0–1.2)
BUN SERPL-MCNC: 10 MG/DL (ref 6–20)
BUN/CREAT SERPL: 13.7 (ref 7–25)
CALCIUM SPEC-SCNC: 9.1 MG/DL (ref 8.6–10.5)
CHLORIDE SERPL-SCNC: 97 MMOL/L (ref 98–107)
CHOLEST SERPL-MCNC: 123 MG/DL (ref 0–200)
CO2 SERPL-SCNC: 28.3 MMOL/L (ref 22–29)
CREAT SERPL-MCNC: 0.73 MG/DL (ref 0.57–1)
GFR SERPL CREATININE-BSD FRML MDRD: 87 ML/MIN/1.73
GLOBULIN UR ELPH-MCNC: 3.9 GM/DL
GLUCOSE SERPL-MCNC: 46 MG/DL (ref 65–99)
HDLC SERPL-MCNC: 29 MG/DL (ref 40–60)
LDLC SERPL CALC-MCNC: 76 MG/DL (ref 0–100)
LDLC/HDLC SERPL: 2.58 {RATIO}
POTASSIUM SERPL-SCNC: 3.5 MMOL/L (ref 3.5–5.2)
PROT SERPL-MCNC: 7.9 G/DL (ref 6–8.5)
SODIUM SERPL-SCNC: 136 MMOL/L (ref 136–145)
T4 FREE SERPL-MCNC: 1.3 NG/DL (ref 0.93–1.7)
TRIGL SERPL-MCNC: 96 MG/DL (ref 0–150)
TSH SERPL DL<=0.05 MIU/L-ACNC: 4.52 UIU/ML (ref 0.27–4.2)
VLDLC SERPL-MCNC: 18 MG/DL (ref 5–40)

## 2021-07-30 LAB
HCV RNA SERPL NAA+PROBE-ACNC: NORMAL IU/ML
HCV RNA SERPL NAA+PROBE-LOG IU: 6.14 LOG10 IU/ML
TEST INFORMATION: NORMAL

## 2021-08-03 RX ORDER — LEVOTHYROXINE SODIUM 0.03 MG/1
25 TABLET ORAL DAILY
Qty: 90 TABLET | Refills: 0 | Status: SHIPPED | OUTPATIENT
Start: 2021-08-03 | End: 2021-10-05

## 2021-08-04 ENCOUNTER — TELEPHONE (OUTPATIENT)
Dept: FAMILY MEDICINE CLINIC | Facility: CLINIC | Age: 46
End: 2021-08-04

## 2021-08-04 NOTE — TELEPHONE ENCOUNTER
Patient notified, she agrees to follow up with Dr. Burden and will start on the levothyroxine, will recheck lab in 3 months

## 2021-08-04 NOTE — TELEPHONE ENCOUNTER
----- Message from Ashley Zarate MD sent at 8/3/2021 10:57 PM EDT -----  Send labs to GI  Call pt , to keep appt with gi  for fu of Hep c  Thyroid test show low thyroid function.  Will start low dose levothyroxine, recheck in 3 mos

## 2021-08-12 RX ORDER — BUSPIRONE HYDROCHLORIDE 15 MG/1
TABLET ORAL
Qty: 90 TABLET | Refills: 0 | Status: SHIPPED | OUTPATIENT
Start: 2021-08-12 | End: 2021-09-14

## 2021-08-12 NOTE — TELEPHONE ENCOUNTER
Rx Refill Note  Requested Prescriptions     Pending Prescriptions Disp Refills   • busPIRone (BUSPAR) 15 MG tablet [Pharmacy Med Name: BUSPIRONE HCL 15 MG TABLET] 90 tablet 0     Sig: TAKE 1 TABLET BY MOUTH THREE TIMES A DAY      Last office visit with prescribing clinician: 7/28/2021      Next office visit with prescribing clinician: Visit date not found            Mell Cain LPN  08/12/21, 15:37 EDT

## 2021-08-27 RX ORDER — ESCITALOPRAM OXALATE 10 MG/1
TABLET ORAL
Qty: 30 TABLET | Refills: 0 | Status: SHIPPED | OUTPATIENT
Start: 2021-08-27 | End: 2022-01-04 | Stop reason: SDUPTHER

## 2021-09-13 NOTE — TELEPHONE ENCOUNTER
Rx Refill Note  Requested Prescriptions     Pending Prescriptions Disp Refills   • busPIRone (BUSPAR) 15 MG tablet [Pharmacy Med Name: BUSPIRONE HCL 15 MG TABLET] 90 tablet 0     Sig: TAKE 1 TABLET BY MOUTH THREE TIMES A DAY      Last office visit with prescribing clinician: 7/28/2021      Next office visit with prescribing clinician: NONE            Mell Cain LPN  09/13/21, 13:42 EDT

## 2021-09-14 RX ORDER — BUSPIRONE HYDROCHLORIDE 15 MG/1
TABLET ORAL
Qty: 180 TABLET | Refills: 0 | Status: SHIPPED | OUTPATIENT
Start: 2021-09-14 | End: 2021-11-10

## 2021-09-28 RX ORDER — HYDROCHLOROTHIAZIDE 12.5 MG/1
12.5 CAPSULE, GELATIN COATED ORAL DAILY
Qty: 90 CAPSULE | Refills: 0 | Status: SHIPPED | OUTPATIENT
Start: 2021-09-28 | End: 2021-12-30

## 2021-10-04 NOTE — TELEPHONE ENCOUNTER
Rx Refill Note    PROTOCOL NOT MET - TSH NOT NORMAL.     Requested Prescriptions     Pending Prescriptions Disp Refills   • levothyroxine (SYNTHROID, LEVOTHROID) 25 MCG tablet [Pharmacy Med Name: LEVOTHYROXINE 25 MCG TABLET] 90 tablet 0     Sig: TAKE 1 TABLET BY MOUTH EVERY DAY      Last office visit with prescribing clinician: 7/28/2021      Next office visit with prescribing clinician: NONE    TSH+Free T4 (07/28/2021 12:04)         Mell Cain LPN  10/04/21, 09:26 EDT

## 2021-10-05 DIAGNOSIS — E03.9 ACQUIRED HYPOTHYROIDISM: Primary | ICD-10-CM

## 2021-10-05 RX ORDER — LEVOTHYROXINE SODIUM 0.03 MG/1
TABLET ORAL
Qty: 28 TABLET | Refills: 0 | Status: SHIPPED | OUTPATIENT
Start: 2021-10-05 | End: 2021-11-16

## 2021-10-06 NOTE — TELEPHONE ENCOUNTER
MAGGIE,  DO YOU MIND TO CONTACT PATIENT AND LET HER KNOW THAT DR. CURTIS WANTS HER TO SCHEDULE AN APPT FOR LAB WORK ON HER THYROID, PLEASE? TEMPORARY SUPPLY OF HER MEDICATION WAS SENT TO THE PHARMACY. THANK YOU!

## 2021-11-10 RX ORDER — BUSPIRONE HYDROCHLORIDE 15 MG/1
TABLET ORAL
Qty: 90 TABLET | Refills: 1 | Status: SHIPPED | OUTPATIENT
Start: 2021-11-10 | End: 2022-01-31

## 2021-11-15 RX ORDER — AMLODIPINE BESYLATE 10 MG/1
TABLET ORAL
Qty: 90 TABLET | Refills: 0 | Status: SHIPPED | OUTPATIENT
Start: 2021-11-15 | End: 2022-01-31

## 2021-11-15 NOTE — TELEPHONE ENCOUNTER
Rx Refill Note  Requested Prescriptions     Pending Prescriptions Disp Refills   • amLODIPine (NORVASC) 10 MG tablet [Pharmacy Med Name: AMLODIPINE BESYLATE 10 MG TAB] 90 tablet 1     Sig: TAKE 1 TABLET BY MOUTH EVERY DAY      Last office visit with prescribing clinician: 7/28/2021      Next office visit with prescribing clinician: Visit date not found     Office Visit with Ashley Zarate MD (07/28/2021)  TSH+Free T4 (10/05/2021 11:08)  Hepatitis C RNA, Quantitative, PCR (graph) (07/28/2021 12:04)  Lipid Panel (07/28/2021 12:04)  Comprehensive Metabolic Panel (07/28/2021 12:04)  TSH+Free T4 (07/28/2021 12:04)         Elvi Cortez CMA  11/15/21, 08:33 EST

## 2021-11-16 RX ORDER — LEVOTHYROXINE SODIUM 0.03 MG/1
TABLET ORAL
Qty: 28 TABLET | Refills: 0 | Status: SHIPPED | OUTPATIENT
Start: 2021-11-16 | End: 2021-12-14

## 2021-11-16 RX ORDER — CLOPIDOGREL BISULFATE 75 MG/1
75 TABLET ORAL DAILY
Qty: 90 TABLET | Refills: 0 | Status: SHIPPED | OUTPATIENT
Start: 2021-11-16 | End: 2022-02-14

## 2021-11-16 NOTE — TELEPHONE ENCOUNTER
Rx Refill Note  Requested Prescriptions     Pending Prescriptions Disp Refills   • clopidogrel (PLAVIX) 75 MG tablet [Pharmacy Med Name: CLOPIDOGREL 75 MG TABLET] 90 tablet 5     Sig: TAKE 1 TABLET BY MOUTH DAILY. MUST SCHEDULE APPT FOR FURTHER REFILLS.      Last office visit with prescribing clinician: 11/3/2020      Next office visit with prescribing clinician: Visit date not found            Sue Quiroz MA  11/16/21, 08:20 EST

## 2021-11-16 NOTE — TELEPHONE ENCOUNTER
Rx Refill Note  Requested Prescriptions     Pending Prescriptions Disp Refills   • levothyroxine (SYNTHROID, LEVOTHROID) 25 MCG tablet [Pharmacy Med Name: LEVOTHYROXINE 25 MCG TABLET] 28 tablet 0     Sig: TAKE 1 TABLET BY MOUTH EVERY DAY      Last office visit with prescribing clinician: 7/28/2021      Next office visit with prescribing clinician: Visit date not found     Office Visit with Ashley Zarate MD (07/28/2021)  TSH+Free T4 (07/28/2021 12:04)  Comprehensive Metabolic Panel (07/28/2021 12:04)         Elvi Cortez CMA  11/16/21, 08:54 EST

## 2021-12-13 RX ORDER — POTASSIUM CHLORIDE 1500 MG/1
TABLET, EXTENDED RELEASE ORAL
Qty: 30 TABLET | Refills: 0 | Status: SHIPPED | OUTPATIENT
Start: 2021-12-13 | End: 2022-01-11

## 2021-12-13 NOTE — TELEPHONE ENCOUNTER
Rx Refill Note  Requested Prescriptions     Pending Prescriptions Disp Refills   • levothyroxine (SYNTHROID, LEVOTHROID) 25 MCG tablet [Pharmacy Med Name: LEVOTHYROXINE 25 MCG TABLET] 28 tablet 0     Sig: TAKE 1 TABLET BY MOUTH EVERY DAY      Last office visit with prescribing clinician: 7/28/2021      Next office visit with prescribing clinician: Visit date not found     Office Visit with Ashley Zarate MD (07/28/2021)  TSH+Free T4 (10/05/2021 11:08)  Lipid Panel (07/28/2021 12:04)  Comprehensive Metabolic Panel (07/28/2021 12:04)  TSH+Free T4 (07/28/2021 12:04)         Elvi Cortez CMA  12/13/21, 08:21 EST

## 2021-12-13 NOTE — TELEPHONE ENCOUNTER
Rx Refill Note  Requested Prescriptions     Pending Prescriptions Disp Refills   • KLOR-CON 20 MEQ CR tablet [Pharmacy Med Name: KLOR-CON M20 TABLET] 90 tablet 5     Sig: TAKE 1 TABLET BY MOUTH EVERY DAY      Last office visit with prescribing clinician: 11/3/2020      Next office visit with prescribing clinician: Visit date not found            Sue Quiroz MA  12/13/21, 08:49 EST

## 2021-12-14 RX ORDER — LEVOTHYROXINE SODIUM 0.03 MG/1
TABLET ORAL
Qty: 28 TABLET | Refills: 0 | Status: SHIPPED | OUTPATIENT
Start: 2021-12-14 | End: 2022-01-05 | Stop reason: SDUPTHER

## 2021-12-30 RX ORDER — HYDROCHLOROTHIAZIDE 12.5 MG/1
CAPSULE, GELATIN COATED ORAL
Qty: 30 CAPSULE | Refills: 0 | Status: SHIPPED | OUTPATIENT
Start: 2021-12-30 | End: 2022-01-28

## 2021-12-30 NOTE — TELEPHONE ENCOUNTER
Rx Refill Note  Requested Prescriptions     Pending Prescriptions Disp Refills   • hydroCHLOROthiazide (MICROZIDE) 12.5 MG capsule [Pharmacy Med Name: HYDROCHLOROTHIAZIDE 12.5 MG CP] 90 capsule 0     Sig: TAKE 1 CAPSULE BY MOUTH EVERY DAY      Last office visit with prescribing clinician: 7/28/2021      Next office visit with prescribing clinician: Visit date not found       Comprehensive Metabolic Panel (07/28/2021 12:04)         Mell Cain LPN  12/30/21, 11:13 EST

## 2022-01-04 ENCOUNTER — OFFICE VISIT (OUTPATIENT)
Dept: FAMILY MEDICINE CLINIC | Facility: CLINIC | Age: 47
End: 2022-01-04

## 2022-01-04 VITALS
WEIGHT: 290.2 LBS | RESPIRATION RATE: 18 BRPM | TEMPERATURE: 97.3 F | BODY MASS INDEX: 48.35 KG/M2 | SYSTOLIC BLOOD PRESSURE: 143 MMHG | HEART RATE: 82 BPM | HEIGHT: 65 IN | DIASTOLIC BLOOD PRESSURE: 98 MMHG | OXYGEN SATURATION: 98 %

## 2022-01-04 DIAGNOSIS — F17.200 TOBACCO USE DISORDER: ICD-10-CM

## 2022-01-04 DIAGNOSIS — Z12.4 PAP SMEAR FOR CERVICAL CANCER SCREENING: ICD-10-CM

## 2022-01-04 DIAGNOSIS — Z86.73 STATUS POST CVA: ICD-10-CM

## 2022-01-04 DIAGNOSIS — Z12.31 SCREENING MAMMOGRAM FOR BREAST CANCER: ICD-10-CM

## 2022-01-04 DIAGNOSIS — Z23 IMMUNIZATION DUE: ICD-10-CM

## 2022-01-04 DIAGNOSIS — B18.2 CHRONIC HEPATITIS C WITHOUT HEPATIC COMA: ICD-10-CM

## 2022-01-04 DIAGNOSIS — R79.89 ELEVATED LFTS: ICD-10-CM

## 2022-01-04 DIAGNOSIS — I25.10 CORONARY ARTERY DISEASE INVOLVING NATIVE CORONARY ARTERY OF NATIVE HEART WITHOUT ANGINA PECTORIS: ICD-10-CM

## 2022-01-04 DIAGNOSIS — R53.82 CHRONIC FATIGUE: ICD-10-CM

## 2022-01-04 DIAGNOSIS — E55.9 VITAMIN D DEFICIENCY: ICD-10-CM

## 2022-01-04 DIAGNOSIS — Z12.12 ENCOUNTER FOR COLORECTAL CANCER SCREENING: ICD-10-CM

## 2022-01-04 DIAGNOSIS — Z79.899 MEDICATION MANAGEMENT: Primary | ICD-10-CM

## 2022-01-04 DIAGNOSIS — E03.9 ACQUIRED HYPOTHYROIDISM: ICD-10-CM

## 2022-01-04 DIAGNOSIS — E78.5 DYSLIPIDEMIA: ICD-10-CM

## 2022-01-04 DIAGNOSIS — Z12.11 ENCOUNTER FOR COLORECTAL CANCER SCREENING: ICD-10-CM

## 2022-01-04 DIAGNOSIS — R05.9 COUGH: ICD-10-CM

## 2022-01-04 LAB
25(OH)D3 SERPL-MCNC: 25.7 NG/ML (ref 30–100)
ALBUMIN SERPL-MCNC: 4.3 G/DL (ref 3.5–5.2)
ALBUMIN/GLOB SERPL: 1.3 G/DL
ALP SERPL-CCNC: 82 U/L (ref 39–117)
ALT SERPL W P-5'-P-CCNC: 50 U/L (ref 1–33)
ANION GAP SERPL CALCULATED.3IONS-SCNC: 12.4 MMOL/L (ref 5–15)
AST SERPL-CCNC: 41 U/L (ref 1–32)
BASOPHILS # BLD AUTO: 0.05 10*3/MM3 (ref 0–0.2)
BASOPHILS NFR BLD AUTO: 1 % (ref 0–1.5)
BILIRUB SERPL-MCNC: 0.5 MG/DL (ref 0–1.2)
BUN SERPL-MCNC: 15 MG/DL (ref 6–20)
BUN/CREAT SERPL: 16.5 (ref 7–25)
CALCIUM SPEC-SCNC: 9.4 MG/DL (ref 8.6–10.5)
CHLORIDE SERPL-SCNC: 102 MMOL/L (ref 98–107)
CHOLEST SERPL-MCNC: 158 MG/DL (ref 0–200)
CO2 SERPL-SCNC: 26.6 MMOL/L (ref 22–29)
CREAT SERPL-MCNC: 0.91 MG/DL (ref 0.57–1)
DEPRECATED RDW RBC AUTO: 44.3 FL (ref 37–54)
EOSINOPHIL # BLD AUTO: 0.17 10*3/MM3 (ref 0–0.4)
EOSINOPHIL NFR BLD AUTO: 3.4 % (ref 0.3–6.2)
ERYTHROCYTE [DISTWIDTH] IN BLOOD BY AUTOMATED COUNT: 12.9 % (ref 12.3–15.4)
GFR SERPL CREATININE-BSD FRML MDRD: 67 ML/MIN/1.73
GLOBULIN UR ELPH-MCNC: 3.4 GM/DL
GLUCOSE SERPL-MCNC: 81 MG/DL (ref 65–99)
HBA1C MFR BLD: 4.9 % (ref 3.5–5.6)
HCT VFR BLD AUTO: 44.6 % (ref 34–46.6)
HDLC SERPL-MCNC: 34 MG/DL (ref 40–60)
HGB BLD-MCNC: 15.4 G/DL (ref 12–15.9)
IMM GRANULOCYTES # BLD AUTO: 0.01 10*3/MM3 (ref 0–0.05)
IMM GRANULOCYTES NFR BLD AUTO: 0.2 % (ref 0–0.5)
LDLC SERPL CALC-MCNC: 103 MG/DL (ref 0–100)
LDLC/HDLC SERPL: 2.96 {RATIO}
LYMPHOCYTES # BLD AUTO: 2.02 10*3/MM3 (ref 0.7–3.1)
LYMPHOCYTES NFR BLD AUTO: 39.9 % (ref 19.6–45.3)
MCH RBC QN AUTO: 32.8 PG (ref 26.6–33)
MCHC RBC AUTO-ENTMCNC: 34.5 G/DL (ref 31.5–35.7)
MCV RBC AUTO: 95.1 FL (ref 79–97)
MONOCYTES # BLD AUTO: 0.31 10*3/MM3 (ref 0.1–0.9)
MONOCYTES NFR BLD AUTO: 6.1 % (ref 5–12)
NEUTROPHILS NFR BLD AUTO: 2.5 10*3/MM3 (ref 1.7–7)
NEUTROPHILS NFR BLD AUTO: 49.4 % (ref 42.7–76)
NRBC BLD AUTO-RTO: 0 /100 WBC (ref 0–0.2)
PLATELET # BLD AUTO: 200 10*3/MM3 (ref 140–450)
PMV BLD AUTO: 11 FL (ref 6–12)
POTASSIUM SERPL-SCNC: 3.9 MMOL/L (ref 3.5–5.2)
PROT SERPL-MCNC: 7.7 G/DL (ref 6–8.5)
RBC # BLD AUTO: 4.69 10*6/MM3 (ref 3.77–5.28)
SODIUM SERPL-SCNC: 141 MMOL/L (ref 136–145)
T4 FREE SERPL-MCNC: 1.4 NG/DL (ref 0.93–1.7)
TRIGL SERPL-MCNC: 116 MG/DL (ref 0–150)
TSH SERPL DL<=0.05 MIU/L-ACNC: 4.71 UIU/ML (ref 0.27–4.2)
VIT B12 BLD-MCNC: 502 PG/ML (ref 211–946)
VLDLC SERPL-MCNC: 21 MG/DL (ref 5–40)
WBC NRBC COR # BLD: 5.06 10*3/MM3 (ref 3.4–10.8)

## 2022-01-04 PROCEDURE — 90686 IIV4 VACC NO PRSV 0.5 ML IM: CPT | Performed by: FAMILY MEDICINE

## 2022-01-04 PROCEDURE — 90471 IMMUNIZATION ADMIN: CPT | Performed by: FAMILY MEDICINE

## 2022-01-04 PROCEDURE — 82607 VITAMIN B-12: CPT | Performed by: FAMILY MEDICINE

## 2022-01-04 PROCEDURE — 99214 OFFICE O/P EST MOD 30 MIN: CPT | Performed by: FAMILY MEDICINE

## 2022-01-04 PROCEDURE — 80050 GENERAL HEALTH PANEL: CPT | Performed by: FAMILY MEDICINE

## 2022-01-04 PROCEDURE — 84439 ASSAY OF FREE THYROXINE: CPT | Performed by: FAMILY MEDICINE

## 2022-01-04 PROCEDURE — 82306 VITAMIN D 25 HYDROXY: CPT | Performed by: FAMILY MEDICINE

## 2022-01-04 PROCEDURE — 80061 LIPID PANEL: CPT | Performed by: FAMILY MEDICINE

## 2022-01-04 PROCEDURE — 83036 HEMOGLOBIN GLYCOSYLATED A1C: CPT | Performed by: FAMILY MEDICINE

## 2022-01-04 RX ORDER — METOPROLOL SUCCINATE 25 MG/1
25 TABLET, EXTENDED RELEASE ORAL DAILY
Qty: 90 TABLET | Refills: 3 | Status: SHIPPED | OUTPATIENT
Start: 2022-01-04 | End: 2022-06-07

## 2022-01-04 RX ORDER — ESCITALOPRAM OXALATE 10 MG/1
10 TABLET ORAL DAILY
Qty: 30 TABLET | Refills: 0 | Status: SHIPPED | OUTPATIENT
Start: 2022-01-04 | End: 2022-01-04 | Stop reason: SDUPTHER

## 2022-01-04 RX ORDER — ESCITALOPRAM OXALATE 10 MG/1
10 TABLET ORAL DAILY
Qty: 90 TABLET | Refills: 0 | Status: SHIPPED | OUTPATIENT
Start: 2022-01-04 | End: 2022-02-21

## 2022-01-04 NOTE — PROGRESS NOTES
Venipuncture Blood Specimen Collection  Venipuncture performed in (R) arm via butterfly by Mell Cain LPN with good hemostasis. Patient tolerated the procedure well without complications.   01/04/22   Mlel Cain LPN

## 2022-01-04 NOTE — PROGRESS NOTES
"    Subjective   Odalis Kaur is a 46 y.o. female.     Chief Complaint   Patient presents with   • Anemia   • Anxiety   • Depression   • Hypertension   The patient has consented to being recorded using [employer].      History of Present Illness   Patient presents with:  Anemia  Anxiety  Depression  Hypertension    Ms. Kaur's blood pressure is mildly elevated at 143/98, and she states that she has not taken her blood pressure medication today. She reports that she has a history of stroke caused by elevated blood pressure. She does not check her blood pressure at home. The patient has gained weight since last visit.    Her last blood sugar was low at 46, and she states that she has no family history of diabetes.    She follows with cardiology, and is due for an appointment with Dr. Muller. She reports that she had 100 percent blockage of an internal carotid artery, and had a stroke about 4 or 5 years ago. She states her hands feel like she cannot use them well, but that has improved significantly since her stroke. She reports that her memory is intact. She denies chest pain, and she does not take nitroglycerin. She is currently on Plavix. She had difficulty getting her metoprolol filled at the pharmacist, and has not had a refill since 11/2020.    She takes Lexapro 10 mg for depression, and would like to increase the dose due to \"feeling down all the time,\" but she has not taken the Lexapro for a while. She adds that she feels fatigued with no energy. She denies difficulty sleeping, falling asleep, or sleeping too much. She notes that she does wake up feeling tired sometimes. She denies overeating or having a poor appetite. She denies feeling bad about herself. She denies difficulty concentrating on things, or being fidgety. She denies suicidal ideations. She is not currently seeing a psychiatrist, and has never seen one. She lives at home with her .     The patient has hepatitis C, and has not been treated " "for that yet because she states her insurance will not pay for the treatment because her levels are not high enough, and GI is following. She has an upcoming appointment with Dr. Burden.    She denies taking vitamin B12 or B12 injections.    The patient notes that she was taking vitamin D, but has been out of the supplement for a few weeks.    She is on thyroid medication.    The patient denies a family history of colon cancer or polyps.    She is a smoker, and has smoked for 12 years. She denies a history of bronchitis, COPD, or emphysema.    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    There is a maternal family history of breast cancer in her grandmother.    She reports she has a \"mild case\" of sleep apnea, but is not compliant with her CPAP. Her  states that she snores, but does not think it is worsening, and she does not stop breathing while she sleeps.    The patient reports that she has recently developed a cough in the morning. She denies ever using an inhaler. She denies a history of asthma.     She is currently not working.    The following portions of the patient's history were reviewed and updated as appropriate: allergies, current medications, past family history, past medical history, past social history, past surgical history and problem list.    Past Medical History:   Diagnosis Date   • Adnexal cyst     LESION   • B12 deficiency    • Coronary artery disease    • Depression    • Hepatitis C     TREATMENT PENDING GI   • Hyperlipidemia    • Hypertension    • Obesity    • MICHELE (obstructive sleep apnea)    • PAD (peripheral artery disease) (HCC)    • Paranasal sinus disease    • Personal history of DVT (deep vein thrombosis)    • Pulmonary nodule    • Snoring    • Stroke (HCC)     LEFT LIP AND SLIGHT FACIAL DROOP 100% OCCLUSION OF  TELMA   • Tobacco use    • UTI (urinary tract infection)        Past Surgical History:   Procedure Laterality Date   •  " SECTION     • HYSTEROSCOPY ENDOMETRIAL ABLATION     • TONSILLECTOMY     • TUBAL ABDOMINAL LIGATION Bilateral        Family History   Problem Relation Age of Onset   • Hypertension Mother    • Cancer Maternal Grandmother    • Bone cancer Maternal Grandmother    • Brain cancer Maternal Grandmother    • Prostate cancer Maternal Grandfather        Review of Systems   Constitutional: Negative for fatigue, unexpected weight gain and unexpected weight loss.   HENT: Negative for congestion, sinus pressure and sore throat.    Eyes: Negative for blurred vision and visual disturbance.   Respiratory: Negative for cough, shortness of breath and wheezing.    Cardiovascular: Negative for chest pain, palpitations and leg swelling.   Gastrointestinal: Negative for abdominal pain, constipation, diarrhea, nausea, vomiting, GERD and indigestion.   Musculoskeletal: Negative for arthralgias, back pain, gait problem, joint swelling and neck pain.   Skin: Negative for rash, skin lesions and bruise.   Allergic/Immunologic: Negative for environmental allergies.   Neurological: Negative for dizziness, tremors, syncope, weakness, light-headedness, headache and memory problem.   Psychiatric/Behavioral: Negative for sleep disturbance, depressed mood and stress. The patient is not nervous/anxious.        Objective   Physical Exam  Vitals and nursing note reviewed.   Constitutional:       Appearance: Normal appearance. She is well-developed. She is obese.      Comments: In no acute distress.   HENT:      Head: Normocephalic and atraumatic.      Mouth/Throat:      Pharynx: Oropharynx is clear.   Eyes:      Extraocular Movements: Extraocular movements intact.      Pupils: Pupils are equal, round, and reactive to light.   Cardiovascular:      Rate and Rhythm: Normal rate and regular rhythm.      Comments: Normal S1 and S2.  Pulmonary:      Breath sounds: Normal air entry.      Comments: Diminished breath sounds.  Abdominal:      General: Bowel sounds  are normal. There is no distension.      Palpations: Abdomen is soft.      Tenderness: There is no abdominal tenderness.   Musculoskeletal:      Cervical back: Normal range of motion and neck supple.      Right lower leg: No edema.      Left lower leg: No edema.      Comments: No clubbing or cyanosis.   Neurological:      Mental Status: She is alert and oriented to person, place, and time.      Motor: Motor function is intact.      Comments: Remarkable for left hand weakness.    Psychiatric:         Behavior: Behavior normal.      Comments: Mood is mildly depressed.         Vitals:    01/04/22 0858   BP: 143/98   Pulse: 82   Resp: 18   Temp: 97.3 °F (36.3 °C)   SpO2: 98%     Body mass index is 48.29 kg/m².    Hemoglobin A1C   Date Value Ref Range Status   01/04/2022 4.9 3.5 - 5.6 % Final     LDL Cholesterol    Date Value Ref Range Status   01/04/2022 103 (H) 0 - 100 mg/dL Final   07/28/2021 76 0 - 100 mg/dL Final   09/09/2020 79 0 - 100 mg/dL Final     TSH   Date Value Ref Range Status   01/04/2022 4.710 (H) 0.270 - 4.200 uIU/mL Final   07/28/2021 4.520 (H) 0.270 - 4.200 uIU/mL Final   09/09/2020 3.610 0.270 - 4.200 uIU/mL Final     Results for orders placed or performed in visit on 01/04/22   TSH+Free T4    Specimen: Arm, Right; Blood   Result Value Ref Range    TSH 4.710 (H) 0.270 - 4.200 uIU/mL    Free T4 1.40 0.93 - 1.70 ng/dL   CBC Auto Differential    Specimen: Arm, Right; Blood   Result Value Ref Range    WBC 5.06 3.40 - 10.80 10*3/mm3    RBC 4.69 3.77 - 5.28 10*6/mm3    Hemoglobin 15.4 12.0 - 15.9 g/dL    Hematocrit 44.6 34.0 - 46.6 %    MCV 95.1 79.0 - 97.0 fL    MCH 32.8 26.6 - 33.0 pg    MCHC 34.5 31.5 - 35.7 g/dL    RDW 12.9 12.3 - 15.4 %    RDW-SD 44.3 37.0 - 54.0 fl    MPV 11.0 6.0 - 12.0 fL    Platelets 200 140 - 450 10*3/mm3    Neutrophil % 49.4 42.7 - 76.0 %    Lymphocyte % 39.9 19.6 - 45.3 %    Monocyte % 6.1 5.0 - 12.0 %    Eosinophil % 3.4 0.3 - 6.2 %    Basophil % 1.0 0.0 - 1.5 %    Immature  Grans % 0.2 0.0 - 0.5 %    Neutrophils, Absolute 2.50 1.70 - 7.00 10*3/mm3    Lymphocytes, Absolute 2.02 0.70 - 3.10 10*3/mm3    Monocytes, Absolute 0.31 0.10 - 0.90 10*3/mm3    Eosinophils, Absolute 0.17 0.00 - 0.40 10*3/mm3    Basophils, Absolute 0.05 0.00 - 0.20 10*3/mm3    Immature Grans, Absolute 0.01 0.00 - 0.05 10*3/mm3    nRBC 0.0 0.0 - 0.2 /100 WBC   Vitamin D 25 hydroxy    Specimen: Arm, Right; Blood   Result Value Ref Range    25 Hydroxy, Vitamin D 25.7 (L) 30.0 - 100.0 ng/ml   Hemoglobin A1c    Specimen: Arm, Right; Blood   Result Value Ref Range    Hemoglobin A1C 4.9 3.5 - 5.6 %   Vitamin B12    Specimen: Arm, Right; Blood   Result Value Ref Range    Vitamin B-12 502 211 - 946 pg/mL   Lipid Panel    Specimen: Arm, Right; Blood   Result Value Ref Range    Total Cholesterol 158 0 - 200 mg/dL    Triglycerides 116 0 - 150 mg/dL    HDL Cholesterol 34 (L) 40 - 60 mg/dL    LDL Cholesterol  103 (H) 0 - 100 mg/dL    VLDL Cholesterol 21 5 - 40 mg/dL    LDL/HDL Ratio 2.96    Comprehensive metabolic panel    Specimen: Arm, Right; Blood   Result Value Ref Range    Glucose 81 65 - 99 mg/dL    BUN 15 6 - 20 mg/dL    Creatinine 0.91 0.57 - 1.00 mg/dL    Sodium 141 136 - 145 mmol/L    Potassium 3.9 3.5 - 5.2 mmol/L    Chloride 102 98 - 107 mmol/L    CO2 26.6 22.0 - 29.0 mmol/L    Calcium 9.4 8.6 - 10.5 mg/dL    Total Protein 7.7 6.0 - 8.5 g/dL    Albumin 4.30 3.50 - 5.20 g/dL    ALT (SGPT) 50 (H) 1 - 33 U/L    AST (SGOT) 41 (H) 1 - 32 U/L    Alkaline Phosphatase 82 39 - 117 U/L    Total Bilirubin 0.5 0.0 - 1.2 mg/dL    eGFR Non African Amer 67 >60 mL/min/1.73    Globulin 3.4 gm/dL    A/G Ratio 1.3 g/dL    BUN/Creatinine Ratio 16.5 7.0 - 25.0    Anion Gap 12.4 5.0 - 15.0 mmol/L   Results for orders placed or performed in visit on 07/28/21   TSH+Free T4    Specimen: Arm, Right; Blood   Result Value Ref Range    TSH 4.520 (H) 0.270 - 4.200 uIU/mL    Free T4 1.30 0.93 - 1.70 ng/dL   Hepatitis C RNA, Quantitative, PCR  (graph)    Specimen: Arm, Right; Blood   Result Value Ref Range    Hepatitis C Quantitation [id number] IU/mL    HCV log10 6.140 log10 IU/mL    Test Information Comment    Lipid Panel    Specimen: Arm, Right; Blood   Result Value Ref Range    Total Cholesterol 123 0 - 200 mg/dL    Triglycerides 96 0 - 150 mg/dL    HDL Cholesterol 29 (L) 40 - 60 mg/dL    LDL Cholesterol  76 0 - 100 mg/dL    VLDL Cholesterol 18 5 - 40 mg/dL    LDL/HDL Ratio 2.58    Comprehensive Metabolic Panel    Specimen: Arm, Right; Blood   Result Value Ref Range    Glucose 46 (C) 65 - 99 mg/dL    BUN 10 6 - 20 mg/dL    Creatinine 0.73 0.57 - 1.00 mg/dL    Sodium 136 136 - 145 mmol/L    Potassium 3.5 3.5 - 5.2 mmol/L    Chloride 97 (L) 98 - 107 mmol/L    CO2 28.3 22.0 - 29.0 mmol/L    Calcium 9.1 8.6 - 10.5 mg/dL    Total Protein 7.9 6.0 - 8.5 g/dL    Albumin 4.00 3.50 - 5.20 g/dL    ALT (SGPT) 24 1 - 33 U/L    AST (SGOT) 27 1 - 32 U/L    Alkaline Phosphatase 88 39 - 117 U/L    Total Bilirubin 0.5 0.0 - 1.2 mg/dL    eGFR Non African Amer 87 >60 mL/min/1.73    Globulin 3.9 gm/dL    A/G Ratio 1.0 g/dL    BUN/Creatinine Ratio 13.7 7.0 - 25.0    Anion Gap 10.7 5.0 - 15.0 mmol/L   Results for orders placed or performed in visit on 09/09/20   TSH+Free T4    Specimen: Arm, Right; Blood   Result Value Ref Range    TSH 3.610 0.270 - 4.200 uIU/mL    Free T4 1.29 0.93 - 1.70 ng/dL   Hepatitis C RNA, Quantitative, PCR (graph)    Specimen: Arm, Right; Blood   Result Value Ref Range    Hepatitis C Quantitation [id number] IU/mL    HCV log10 6.572 log10 IU/mL    Test Information Comment    CBC (No Diff)    Specimen: Arm, Right; Blood   Result Value Ref Range    WBC 4.31 3.40 - 10.80 10*3/mm3    RBC 4.21 3.77 - 5.28 10*6/mm3    Hemoglobin 14.0 12.0 - 15.9 g/dL    Hematocrit 40.9 34.0 - 46.6 %    MCV 97.1 (H) 79.0 - 97.0 fL    MCH 33.3 (H) 26.6 - 33.0 pg    MCHC 34.2 31.5 - 35.7 g/dL    RDW 12.7 12.3 - 15.4 %    RDW-SD 44.9 37.0 - 54.0 fl    MPV 10.9 6.0 - 12.0 fL     Platelets 203 140 - 450 10*3/mm3     *Note: Due to a large number of results and/or encounters for the requested time period, some results have not been displayed. A complete set of results can be found in Results Review.       Assessment/Plan   Diagnoses and all orders for this visit:    1. Medication management (Primary)  -     CBC & Differential  -     Comprehensive metabolic panel; Future  -     Hemoglobin A1c  -     Comprehensive metabolic panel    2. Acquired hypothyroidism  -     TSH+Free T4    3. Elevated LFTs    4. Dyslipidemia  -     Lipid Panel; Future  -     Lipid Panel    5. Coronary artery disease involving native coronary artery of native heart without angina pectoris    6. Vitamin D deficiency  -     Vitamin D 25 hydroxy; Future  -     Vitamin D 25 hydroxy    7. Chronic fatigue  -     Vitamin B12    8. BMI 45.0-49.9, adult (HCC)    9. Immunization due  -     FluLaval/Fluarix/Fluzone >6 Months ([id number])    10. Encounter for colorectal cancer screening  -     Cancel: Ambulatory Referral to Gastroenterology  -     Ambulatory Referral to Gastroenterology    11. Pap smear for cervical cancer screening  -     Ambulatory Referral to Gynecology    12. Chronic hepatitis C without hepatic coma (HCC)  -     Ambulatory Referral to Gastroenterology    13. Screening mammogram for breast cancer  -     Mammo Screening Digital Tomosynthesis Bilateral With CAD; Future    14. Tobacco use disorder  -     XR Chest 2 View; Future    15. Status post CVA    16. Cough  -     XR Chest 2 View; Future    Other orders  -     Discontinue: escitalopram (LEXAPRO) 10 MG tablet; Take 1 tablet by mouth Daily.  Dispense: 30 tablet; Refill: 0  -     metoprolol succinate XL (TOPROL-XL) 25 MG 24 hr tablet; Take 1 tablet by mouth Daily.  Dispense: 90 tablet; Refill: 3  -     escitalopram (LEXAPRO) 10 MG tablet; Take 1 tablet by mouth Daily.  Dispense: 90 tablet; Refill: 0    1. Hypertension  - Blood pressure is elevated today, but she  has not taken her amlodipine 10 mg, HCTZ, losartan, and metoprolol 25 mg today.  - Refill for metoprolol sent to the pharmacy.    2. Low blood sugar  - Her last lab revealed a low blood sugar of 46.   - Check HbA1c.    3. Depression, and anxiety  - She had difficulty refilling the Lexapro 10 mg daily at her pharmacy so has not been taking it.  - We will restart Lexapro 10 mg.    4. Cough  - She has a long history of smoking.  - We will obtain a chest x-ray    5. Tobacco use  Smoking cessation counseling discussed with the patient today.    6. Vitamin D deficiency  - We will check a vitamin D level.    7. History of hepatitis C  - We will check labs for hepatitis C today.    8. Health maintenance  - It has been more than 1 year since her last mammogram. A referral has been placed.   - She wishes to have an influenza vaccine today.  - The last COVID-19 vaccine was performed in 08/2021, and she is due for her booster.  - A colonoscopy has not been performed. A referral to GI sent.  - She had an ablation performed at [facility] due to heavy menstrual periods. Her last Pap test was 2 years ago, and she denies having abnormal Pap tests. Referral to GYN sent.  - It has been more than 10 years since her last tetanus shot. She will check with the pharmacy about getting a tetanus shot.  - We will check labs including thyroid, HbA1c, potassium, vitamin D, and hepatitis C.    She will follow up for her annual physical in 3 months.      Transcribed from ambient dictation for Ashley Zarate MD by Penelope Muniz.  01/04/22   10:29 EST    Patient verbalized consent to the visit recording.

## 2022-01-05 RX ORDER — LEVOTHYROXINE SODIUM 0.03 MG/1
TABLET ORAL
Qty: 114 TABLET | Refills: 1 | Status: SHIPPED | OUTPATIENT
Start: 2022-01-05 | End: 2022-04-20 | Stop reason: SDUPTHER

## 2022-01-07 ENCOUNTER — TELEPHONE (OUTPATIENT)
Dept: FAMILY MEDICINE CLINIC | Facility: CLINIC | Age: 47
End: 2022-01-07

## 2022-01-07 NOTE — TELEPHONE ENCOUNTER
Hub is instructed to read the documentation below to patient  CALLED PT. NO ANSWER. PER HIPAA, MAY LEAVE DETAILED VM. VM LEFT ADVISING PT OF THE FOLLOWING:  - THYROID MILDLY ABNORMAL.   - TAKE 2 THYROID TABLETS (LEVOTHYROXINE) ON SAT AND SUN.   - CONTINUE TO TAKE 1 TABLET OF LEVOTHYROXINE/SYNTHROID MON THRU FRI.   - LOW CHOLESTEROL DIET TO HELP LOWER BAD CHOLESTEROL. STRAY AWAY FROM RED MEATS, FRIED/BREADED OR FATTY FOODS.   - PATIENT WAS ADVISED TO RETURN PHONE CALL TO OFFICE TO SCHEDULE FOLLOW UP THYROID LABS TO RECHECK AFTER BEING ON THIS REGIMEN FOR 6 TO 8 WEEKS. DOES NOT REQUIRE PT TO FAST.

## 2022-01-07 NOTE — TELEPHONE ENCOUNTER
Caller: Odalis Kaur    Relationship: Self    Best call back number: [phone]    Who are you requesting to speak with (clinical staff, provider,  specific staff member): TIMUR LAI    What was the call regarding: PATIENT CALLED TO RETURN A CALL SHE MISSED FROM TIMUR. THE MESSAGE WAS FOR THE HUB TO READ. PATIENT WOULD LIKE CLARIFICATION ON THE LEVOTHYROXINE/SYNTHROID AND WHETHER SHE WOULD TAKE IT EVERY WEEK MONDAY THRU Friday OR JUST THIS WEEK, AND CLARIFICATION ON IF SHE TAKES TWO THYROID TABLETS EVERY SAT AND SUN OR JUST THIS WEEK.     Do you require a callback: YES

## 2022-01-07 NOTE — TELEPHONE ENCOUNTER
----- Message from Ashley Zarate MD sent at 1/5/2022  7:44 PM EST -----  Thyroid mildly abnormal, take 2 tablets Sat, Sun, 1 tablet mon to fri  Low cholesterol diet to lower bad cholesterol

## 2022-01-10 NOTE — TELEPHONE ENCOUNTER
S/W PATIENT. SHE INQUIRED IF IT IS EVERY SAT AND SUN. PATIENT WAS ADVISED THAT YES, IT IS EVERY SAT AND SUN THAT SHE WILL TAKE 2 TABS. OFFERED TO SCHEDULE HER A LAB DRAW APPT FOR 6 TO 8 WEEKS  AFTER BEING ON THIS DOSING REGIMEN. PATIENT DECLINED, AND STATED THAT SHE WOULD CALL THE OFFICE TO SCHEDULE.

## 2022-01-11 RX ORDER — POTASSIUM CHLORIDE 1500 MG/1
TABLET, EXTENDED RELEASE ORAL
Qty: 30 TABLET | Refills: 0 | Status: SHIPPED | OUTPATIENT
Start: 2022-01-11 | End: 2022-02-14

## 2022-01-11 NOTE — TELEPHONE ENCOUNTER
Rx Refill Note  Requested Prescriptions     Pending Prescriptions Disp Refills   • KLOR-CON 20 MEQ CR tablet [Pharmacy Med Name: KLOR-CON M20 TABLET] 30 tablet 0     Sig: TAKE 1 TABLET BY MOUTH EVERY DAY      Last office visit with prescribing clinician: 11/3/2020      Next office visit with prescribing clinician: Visit date not found            Sue Quiroz MA  01/11/22, 09:20 EST

## 2022-01-13 RX ORDER — ATORVASTATIN CALCIUM 10 MG/1
TABLET, FILM COATED ORAL
Qty: 30 TABLET | Refills: 0 | Status: SHIPPED | OUTPATIENT
Start: 2022-01-13 | End: 2022-02-19

## 2022-01-13 NOTE — TELEPHONE ENCOUNTER
Rx Refill Note  Requested Prescriptions     Pending Prescriptions Disp Refills   • atorvastatin (LIPITOR) 10 MG tablet [Pharmacy Med Name: ATORVASTATIN 10 MG TABLET] 90 tablet 5     Sig: TAKE 1 TABLET BY MOUTH EVERYDAY AT BEDTIME      Last office visit with prescribing clinician: 11/3/2020      Next office visit with prescribing clinician: Visit date not found            Sue Quiroz MA  01/13/22, 08:28 EST

## 2022-01-28 RX ORDER — HYDROCHLOROTHIAZIDE 12.5 MG/1
12.5 CAPSULE, GELATIN COATED ORAL EVERY MORNING
Qty: 30 CAPSULE | Refills: 1 | Status: SHIPPED | OUTPATIENT
Start: 2022-01-28 | End: 2022-03-10

## 2022-01-30 RX ORDER — ATORVASTATIN CALCIUM 10 MG/1
TABLET, FILM COATED ORAL
Qty: 30 TABLET | Refills: 0 | OUTPATIENT
Start: 2022-01-30

## 2022-01-30 NOTE — TELEPHONE ENCOUNTER
Rx Refill Note  Requested Prescriptions     Refused Prescriptions Disp Refills   • atorvastatin (LIPITOR) 10 MG tablet [Pharmacy Med Name: ATORVASTATIN 10 MG TABLET] 30 tablet 0     Sig: TAKE 1 TABLET BY MOUTH EVERYDAY AT BEDTIME      Last office visit with prescribing clinician: 11/3/2020      Next office visit with prescribing clinician: Visit date not found            Zarina Wiley MA  01/30/22, 15:33 EST     * PATIENT NEEDS APPT FOR ANY FURTHER REFILLS*

## 2022-02-01 RX ORDER — AMLODIPINE BESYLATE 10 MG/1
TABLET ORAL
Qty: 90 TABLET | Refills: 1 | Status: SHIPPED | OUTPATIENT
Start: 2022-02-01 | End: 2022-07-19

## 2022-02-01 RX ORDER — BUSPIRONE HYDROCHLORIDE 15 MG/1
TABLET ORAL
Qty: 270 TABLET | Refills: 1 | Status: SHIPPED | OUTPATIENT
Start: 2022-02-01

## 2022-02-14 RX ORDER — POTASSIUM CHLORIDE 1500 MG/1
TABLET, EXTENDED RELEASE ORAL
Qty: 15 TABLET | Refills: 0 | Status: SHIPPED | OUTPATIENT
Start: 2022-02-14 | End: 2022-03-21

## 2022-02-14 RX ORDER — CLOPIDOGREL BISULFATE 75 MG/1
75 TABLET ORAL DAILY
Qty: 30 TABLET | Refills: 0 | Status: SHIPPED | OUTPATIENT
Start: 2022-02-14 | End: 2022-05-12

## 2022-02-14 NOTE — TELEPHONE ENCOUNTER
Rx Refill Note  Requested Prescriptions     Pending Prescriptions Disp Refills   • KLOR-CON 20 MEQ CR tablet [Pharmacy Med Name: KLOR-CON M20 TABLET] 30 tablet 0     Sig: TAKE 1 TABLET BY MOUTH EVERY DAY      Last office visit with prescribing clinician: 11/3/2020      Next office visit with prescribing clinician: Visit date not found            Sue Quiroz MA  02/14/22, 09:28 EST

## 2022-02-14 NOTE — TELEPHONE ENCOUNTER
Rx Refill Note  Requested Prescriptions     Pending Prescriptions Disp Refills   • clopidogrel (PLAVIX) 75 MG tablet [Pharmacy Med Name: CLOPIDOGREL 75 MG TABLET] 90 tablet 0     Sig: TAKE 1 TABLET BY MOUTH DAILY. MUST SCHEDULE APPT FOR FURTHER REFILLS.      Last office visit with prescribing clinician: 11/3/2020      Next office visit with prescribing clinician: 2/14/2022            Sue Quiroz MA  02/14/22, 10:17 EST

## 2022-02-19 RX ORDER — ATORVASTATIN CALCIUM 10 MG/1
TABLET, FILM COATED ORAL
Qty: 30 TABLET | Refills: 0 | Status: SHIPPED | OUTPATIENT
Start: 2022-02-19 | End: 2022-03-16

## 2022-02-19 NOTE — TELEPHONE ENCOUNTER
Rx Refill Note  Requested Prescriptions     Pending Prescriptions Disp Refills   • atorvastatin (LIPITOR) 10 MG tablet [Pharmacy Med Name: ATORVASTATIN 10 MG TABLET] 30 tablet 0     Sig: TAKE 1 TABLET BY MOUTH EVERYDAY AT BEDTIME      Last office visit with prescribing clinician: 11/3/2020      Next office visit with prescribing clinician: Visit date not found     Spoke with patient today was informed to call Monday morning to schedule appointment. Refilled her medication for 30 days was informed no more refills until seen. rodolfo Mcpherson MA  02/19/22, 11:39 EST

## 2022-02-21 RX ORDER — ESCITALOPRAM OXALATE 10 MG/1
TABLET ORAL
Qty: 90 TABLET | Refills: 0 | Status: SHIPPED | OUTPATIENT
Start: 2022-02-21 | End: 2022-05-13

## 2022-02-21 NOTE — TELEPHONE ENCOUNTER
Rx Refill Note    PROTOCOLS NOT MET     Requested Prescriptions     Pending Prescriptions Disp Refills   • losartan (COZAAR) 100 MG tablet [Pharmacy Med Name: Losartan Potassium 100 MG Oral Tablet] 90 tablet 0     Sig: Take 1 tablet by mouth once daily      Last office visit with prescribing clinician:    1-4-2022  Next office visit with prescribing clinician: 4-5-2022    Comprehensive metabolic panel (01/04/2022 10:12)         Mell Cain LPN  02/21/22, 08:26 EST

## 2022-02-22 RX ORDER — LOSARTAN POTASSIUM 100 MG/1
TABLET ORAL
Qty: 45 TABLET | Refills: 0 | Status: SHIPPED | OUTPATIENT
Start: 2022-02-22 | End: 2022-04-05 | Stop reason: SDUPTHER

## 2022-03-10 RX ORDER — HYDROCHLOROTHIAZIDE 12.5 MG/1
CAPSULE, GELATIN COATED ORAL
Qty: 30 CAPSULE | Refills: 1 | Status: SHIPPED | OUTPATIENT
Start: 2022-03-10 | End: 2022-05-13

## 2022-03-14 RX ORDER — CLOPIDOGREL BISULFATE 75 MG/1
75 TABLET ORAL DAILY
Qty: 30 TABLET | Refills: 0 | OUTPATIENT
Start: 2022-03-14

## 2022-03-14 NOTE — TELEPHONE ENCOUNTER
Rx Refill Note  Requested Prescriptions     Pending Prescriptions Disp Refills   • clopidogrel (PLAVIX) 75 MG tablet [Pharmacy Med Name: CLOPIDOGREL 75 MG TABLET] 30 tablet 0     Sig: TAKE 1 TABLET BY MOUTH DAILY. MUST SCHEDULE APPT FOR FURTHER REFILLS.      Last office visit with prescribing clinician: 11/3/2020      Next office visit with prescribing clinician: Visit date not found            Zarina Wiley MA  03/14/22, 14:56 EDT     PATIENT NEEDS APPT FOR ANY FURTHER REFILLS. PATIENT HAS NOT BEEN SEEN IN WELL OVER 1 YEAR.

## 2022-03-16 RX ORDER — ATORVASTATIN CALCIUM 10 MG/1
TABLET, FILM COATED ORAL
Qty: 30 TABLET | Refills: 0 | Status: SHIPPED | OUTPATIENT
Start: 2022-03-16 | End: 2022-04-27 | Stop reason: SDUPTHER

## 2022-03-16 NOTE — TELEPHONE ENCOUNTER
Rx Refill Note  Requested Prescriptions     Pending Prescriptions Disp Refills   • atorvastatin (LIPITOR) 10 MG tablet [Pharmacy Med Name: ATORVASTATIN 10 MG TABLET] 30 tablet 0     Sig: TAKE 1 TABLET BY MOUTH EVERYDAY AT BEDTIME      Last office visit with prescribing clinician: 11/3/2020      Next office visit with prescribing clinician: Visit date not found            Sue Quiroz MA  03/16/22, 15:44 EDT

## 2022-03-16 NOTE — TELEPHONE ENCOUNTER
Pt contacted, she states she is still seeing Dr. Brown. appt scheduled on 5/3 in [place]. 30 day supply sent in.

## 2022-03-21 RX ORDER — POTASSIUM CHLORIDE 1500 MG/1
TABLET, EXTENDED RELEASE ORAL
Qty: 30 TABLET | Refills: 0 | Status: SHIPPED | OUTPATIENT
Start: 2022-03-21 | End: 2022-04-27

## 2022-03-21 NOTE — TELEPHONE ENCOUNTER
Rx Refill Note  Requested Prescriptions     Pending Prescriptions Disp Refills   • KLOR-CON 20 MEQ CR tablet [Pharmacy Med Name: KLOR-CON M20 TABLET] 15 tablet 0     Sig: TAKE 1 TABLET BY MOUTH EVERY DAY      Last office visit with prescribing clinician: 11/3/2020      Next office visit with prescribing clinician: 5/3/2022            Sue Quiroz MA  03/21/22, 10:08 EDT

## 2022-04-05 ENCOUNTER — OFFICE VISIT (OUTPATIENT)
Dept: FAMILY MEDICINE CLINIC | Facility: CLINIC | Age: 47
End: 2022-04-05

## 2022-04-05 VITALS
DIASTOLIC BLOOD PRESSURE: 81 MMHG | OXYGEN SATURATION: 98 % | RESPIRATION RATE: 18 BRPM | TEMPERATURE: 97.7 F | WEIGHT: 293 LBS | HEIGHT: 65 IN | BODY MASS INDEX: 48.82 KG/M2 | SYSTOLIC BLOOD PRESSURE: 127 MMHG | HEART RATE: 72 BPM

## 2022-04-05 DIAGNOSIS — E78.5 DYSLIPIDEMIA: ICD-10-CM

## 2022-04-05 DIAGNOSIS — B18.2 CHRONIC HEPATITIS C WITHOUT HEPATIC COMA: ICD-10-CM

## 2022-04-05 DIAGNOSIS — I25.10 CORONARY ARTERY DISEASE INVOLVING NATIVE CORONARY ARTERY OF NATIVE HEART WITHOUT ANGINA PECTORIS: ICD-10-CM

## 2022-04-05 DIAGNOSIS — E03.9 ACQUIRED HYPOTHYROIDISM: Primary | ICD-10-CM

## 2022-04-05 DIAGNOSIS — Z00.00 ANNUAL PHYSICAL EXAM: ICD-10-CM

## 2022-04-05 DIAGNOSIS — F17.200 TOBACCO USE DISORDER: ICD-10-CM

## 2022-04-05 DIAGNOSIS — F41.9 ANXIETY: ICD-10-CM

## 2022-04-05 DIAGNOSIS — I10 PRIMARY HYPERTENSION: ICD-10-CM

## 2022-04-05 LAB
T4 FREE SERPL-MCNC: 1.17 NG/DL (ref 0.93–1.7)
TSH SERPL DL<=0.05 MIU/L-ACNC: 3.53 UIU/ML (ref 0.27–4.2)

## 2022-04-05 PROCEDURE — 99396 PREV VISIT EST AGE 40-64: CPT | Performed by: FAMILY MEDICINE

## 2022-04-05 PROCEDURE — 84443 ASSAY THYROID STIM HORMONE: CPT | Performed by: FAMILY MEDICINE

## 2022-04-05 PROCEDURE — 84439 ASSAY OF FREE THYROXINE: CPT | Performed by: FAMILY MEDICINE

## 2022-04-05 RX ORDER — LOSARTAN POTASSIUM 100 MG/1
100 TABLET ORAL DAILY
Qty: 90 TABLET | Refills: 1 | Status: SHIPPED | OUTPATIENT
Start: 2022-04-05 | End: 2022-04-18

## 2022-04-05 RX ORDER — VELPATASVIR AND SOFOSBUVIR 100; 400 MG/1; MG/1
1 TABLET, FILM COATED ORAL DAILY
COMMUNITY
Start: 2022-03-31

## 2022-04-05 NOTE — PROGRESS NOTES
Subjective   Odalis Kaur is a 46 y.o. female.     Chief Complaint   Patient presents with   • Annual Exam     Physical. Patient believes she is suppose to have her thyroid checked today.        History of Present Illness   Pt here hfor annual exam  Last pap 2 years ago for [facility], ormal  Now seeing GI for Hep C infection treatment, reports doing well , colonoscopy planned  Fu CAD, , stable on meds, will fu with cardiology  Has gained weighjtt, has stopped soft drinks working on weight loss with portion control, exercise recommended  Still smoking, has cut down from 2 ppd to 1 ppd, smoking cessation recommended  Hx MICHELE, mild, refused cpap  Sees GYN, had  Pap,ablation , amenorrheic since, will fu  With GYN  Labs and meds reviewed  Hx lung nodule, plan CT chest to fu    The following portions of the patient's history were reviewed and updated as appropriate: allergies, current medications, past family history, past medical history, past social history, past surgical history and problem list.    Past Medical History:   Diagnosis Date   • Adnexal cyst     LESION   • B12 deficiency    • Coronary artery disease    • Depression    • Hepatitis C     TREATMENT PENDING GI   • Hyperlipidemia    • Hypertension    • Obesity    • MICHELE (obstructive sleep apnea)    • PAD (peripheral artery disease) (HCC)    • Paranasal sinus disease    • Personal history of DVT (deep vein thrombosis)    • Pulmonary nodule    • Snoring    • Stroke (HCC)     LEFT LIP AND SLIGHT FACIAL DROOP 100% OCCLUSION OF  TELMA   • Tobacco use    • UTI (urinary tract infection)        Past Surgical History:   Procedure Laterality Date   •  SECTION     • HYSTEROSCOPY ENDOMETRIAL ABLATION     • TONSILLECTOMY     • TUBAL ABDOMINAL LIGATION Bilateral        Family History   Problem Relation Age of Onset   • Hypertension Mother    • Cancer Maternal Grandmother    • Bone cancer Maternal Grandmother    • Brain cancer Maternal Grandmother    •  Prostate cancer Maternal Grandfather        Review of Systems   Constitutional: Positive for unexpected weight gain. Negative for fatigue and unexpected weight loss.   HENT: Negative for congestion, sinus pressure and sore throat.         Normal smell/taste   Eyes: Negative for blurred vision and visual disturbance.   Respiratory: Negative for cough, shortness of breath and wheezing.    Cardiovascular: Negative for chest pain, palpitations and leg swelling.   Gastrointestinal: Negative for abdominal pain, constipation, diarrhea, nausea, vomiting, GERD and indigestion.   Musculoskeletal: Negative for arthralgias, back pain, gait problem, joint swelling and neck pain.   Skin: Negative for rash, skin lesions and wound.   Allergic/Immunologic: Negative for environmental allergies.   Neurological: Negative for dizziness, tremors, syncope, weakness, light-headedness, headache and memory problem.   Psychiatric/Behavioral: Negative for sleep disturbance, depressed mood and stress. The patient is not nervous/anxious.        Objective   Physical Exam  Vitals and nursing note reviewed.   Constitutional:       General: She is not in acute distress.     Appearance: She is well-developed. She is obese. She is not diaphoretic.   HENT:      Head: Normocephalic and atraumatic.      Right Ear: External ear normal.      Left Ear: External ear normal.      Nose: Nose normal.   Eyes:      Conjunctiva/sclera: Conjunctivae normal.      Pupils: Pupils are equal, round, and reactive to light.   Neck:      Thyroid: No thyromegaly.   Cardiovascular:      Rate and Rhythm: Normal rate and regular rhythm.      Heart sounds: Normal heart sounds. No murmur heard.    No friction rub. No gallop.   Pulmonary:      Effort: Pulmonary effort is normal.      Breath sounds: Normal breath sounds. No wheezing.   Abdominal:      General: Bowel sounds are normal.      Palpations: Abdomen is soft.      Tenderness: There is no abdominal tenderness.    Musculoskeletal:         General: No tenderness or deformity. Normal range of motion.      Cervical back: Normal range of motion and neck supple.   Lymphadenopathy:      Cervical: No cervical adenopathy.   Skin:     General: Skin is warm and dry.      Capillary Refill: Capillary refill takes less than 2 seconds.      Findings: No rash.   Neurological:      Mental Status: She is alert and oriented to person, place, and time.      Cranial Nerves: No cranial nerve deficit.   Psychiatric:         Behavior: Behavior normal.         Thought Content: Thought content normal.         Judgment: Judgment normal.         Vitals:    04/05/22 0829   BP: 127/81   Pulse: 72   Resp: 18   Temp: 97.7 °F (36.5 °C)   SpO2: 98%     Body mass index is 48.76 kg/m².    Hemoglobin A1C   Date Value Ref Range Status   01/04/2022 4.9 3.5 - 5.6 % Final     LDL Cholesterol    Date Value Ref Range Status   01/04/2022 103 (H) 0 - 100 mg/dL Final   07/28/2021 76 0 - 100 mg/dL Final   09/09/2020 79 0 - 100 mg/dL Final     TSH   Date Value Ref Range Status   01/04/2022 4.710 (H) 0.270 - 4.200 uIU/mL Final   07/28/2021 4.520 (H) 0.270 - 4.200 uIU/mL Final   09/09/2020 3.610 0.270 - 4.200 uIU/mL Final     Results for orders placed or performed in visit on 01/04/22   TSH+Free T4    Specimen: Arm, Right; Blood   Result Value Ref Range    TSH 4.710 (H) 0.270 - 4.200 uIU/mL    Free T4 1.40 0.93 - 1.70 ng/dL   CBC Auto Differential    Specimen: Arm, Right; Blood   Result Value Ref Range    WBC 5.06 3.40 - 10.80 10*3/mm3    RBC 4.69 3.77 - 5.28 10*6/mm3    Hemoglobin 15.4 12.0 - 15.9 g/dL    Hematocrit 44.6 34.0 - 46.6 %    MCV 95.1 79.0 - 97.0 fL    MCH 32.8 26.6 - 33.0 pg    MCHC 34.5 31.5 - 35.7 g/dL    RDW 12.9 12.3 - 15.4 %    RDW-SD 44.3 37.0 - 54.0 fl    MPV 11.0 6.0 - 12.0 fL    Platelets 200 140 - 450 10*3/mm3    Neutrophil % 49.4 42.7 - 76.0 %    Lymphocyte % 39.9 19.6 - 45.3 %    Monocyte % 6.1 5.0 - 12.0 %    Eosinophil % 3.4 0.3 - 6.2 %     Basophil % 1.0 0.0 - 1.5 %    Immature Grans % 0.2 0.0 - 0.5 %    Neutrophils, Absolute 2.50 1.70 - 7.00 10*3/mm3    Lymphocytes, Absolute 2.02 0.70 - 3.10 10*3/mm3    Monocytes, Absolute 0.31 0.10 - 0.90 10*3/mm3    Eosinophils, Absolute 0.17 0.00 - 0.40 10*3/mm3    Basophils, Absolute 0.05 0.00 - 0.20 10*3/mm3    Immature Grans, Absolute 0.01 0.00 - 0.05 10*3/mm3    nRBC 0.0 0.0 - 0.2 /100 WBC   Vitamin D 25 hydroxy    Specimen: Arm, Right; Blood   Result Value Ref Range    25 Hydroxy, Vitamin D 25.7 (L) 30.0 - 100.0 ng/ml   Hemoglobin A1c    Specimen: Arm, Right; Blood   Result Value Ref Range    Hemoglobin A1C 4.9 3.5 - 5.6 %   Vitamin B12    Specimen: Arm, Right; Blood   Result Value Ref Range    Vitamin B-12 502 211 - 946 pg/mL   Lipid Panel    Specimen: Arm, Right; Blood   Result Value Ref Range    Total Cholesterol 158 0 - 200 mg/dL    Triglycerides 116 0 - 150 mg/dL    HDL Cholesterol 34 (L) 40 - 60 mg/dL    LDL Cholesterol  103 (H) 0 - 100 mg/dL    VLDL Cholesterol 21 5 - 40 mg/dL    LDL/HDL Ratio 2.96    Comprehensive metabolic panel    Specimen: Arm, Right; Blood   Result Value Ref Range    Glucose 81 65 - 99 mg/dL    BUN 15 6 - 20 mg/dL    Creatinine 0.91 0.57 - 1.00 mg/dL    Sodium 141 136 - 145 mmol/L    Potassium 3.9 3.5 - 5.2 mmol/L    Chloride 102 98 - 107 mmol/L    CO2 26.6 22.0 - 29.0 mmol/L    Calcium 9.4 8.6 - 10.5 mg/dL    Total Protein 7.7 6.0 - 8.5 g/dL    Albumin 4.30 3.50 - 5.20 g/dL    ALT (SGPT) 50 (H) 1 - 33 U/L    AST (SGOT) 41 (H) 1 - 32 U/L    Alkaline Phosphatase 82 39 - 117 U/L    Total Bilirubin 0.5 0.0 - 1.2 mg/dL    eGFR Non African Amer 67 >60 mL/min/1.73    Globulin 3.4 gm/dL    A/G Ratio 1.3 g/dL    BUN/Creatinine Ratio 16.5 7.0 - 25.0    Anion Gap 12.4 5.0 - 15.0 mmol/L   Results for orders placed or performed in visit on 07/28/21   TSH+Free T4    Specimen: Arm, Right; Blood   Result Value Ref Range    TSH 4.520 (H) 0.270 - 4.200 uIU/mL    Free T4 1.30 0.93 - 1.70 ng/dL    Hepatitis C RNA, Quantitative, PCR (graph)    Specimen: Arm, Right; Blood   Result Value Ref Range    Hepatitis C Quantitation [id number] IU/mL    HCV log10 6.140 log10 IU/mL    Test Information Comment    Lipid Panel    Specimen: Arm, Right; Blood   Result Value Ref Range    Total Cholesterol 123 0 - 200 mg/dL    Triglycerides 96 0 - 150 mg/dL    HDL Cholesterol 29 (L) 40 - 60 mg/dL    LDL Cholesterol  76 0 - 100 mg/dL    VLDL Cholesterol 18 5 - 40 mg/dL    LDL/HDL Ratio 2.58    Comprehensive Metabolic Panel    Specimen: Arm, Right; Blood   Result Value Ref Range    Glucose 46 (C) 65 - 99 mg/dL    BUN 10 6 - 20 mg/dL    Creatinine 0.73 0.57 - 1.00 mg/dL    Sodium 136 136 - 145 mmol/L    Potassium 3.5 3.5 - 5.2 mmol/L    Chloride 97 (L) 98 - 107 mmol/L    CO2 28.3 22.0 - 29.0 mmol/L    Calcium 9.1 8.6 - 10.5 mg/dL    Total Protein 7.9 6.0 - 8.5 g/dL    Albumin 4.00 3.50 - 5.20 g/dL    ALT (SGPT) 24 1 - 33 U/L    AST (SGOT) 27 1 - 32 U/L    Alkaline Phosphatase 88 39 - 117 U/L    Total Bilirubin 0.5 0.0 - 1.2 mg/dL    eGFR Non African Amer 87 >60 mL/min/1.73    Globulin 3.9 gm/dL    A/G Ratio 1.0 g/dL    BUN/Creatinine Ratio 13.7 7.0 - 25.0    Anion Gap 10.7 5.0 - 15.0 mmol/L   Results for orders placed or performed in visit on 09/09/20   TSH+Free T4    Specimen: Arm, Right; Blood   Result Value Ref Range    TSH 3.610 0.270 - 4.200 uIU/mL    Free T4 1.29 0.93 - 1.70 ng/dL   Hepatitis C RNA, Quantitative, PCR (graph)    Specimen: Arm, Right; Blood   Result Value Ref Range    Hepatitis C Quantitation [id number] IU/mL    HCV log10 6.572 log10 IU/mL    Test Information Comment    CBC (No Diff)    Specimen: Arm, Right; Blood   Result Value Ref Range    WBC 4.31 3.40 - 10.80 10*3/mm3    RBC 4.21 3.77 - 5.28 10*6/mm3    Hemoglobin 14.0 12.0 - 15.9 g/dL    Hematocrit 40.9 34.0 - 46.6 %    MCV 97.1 (H) 79.0 - 97.0 fL    MCH 33.3 (H) 26.6 - 33.0 pg    MCHC 34.2 31.5 - 35.7 g/dL    RDW 12.7 12.3 - 15.4 %    RDW-SD 44.9 37.0 - 54.0  fl    MPV 10.9 6.0 - 12.0 fL    Platelets 203 140 - 450 10*3/mm3     *Note: Due to a large number of results and/or encounters for the requested time period, some results have not been displayed. A complete set of results can be found in Results Review.       Assessment/Plan   Diagnoses and all orders for this visit:    1. Acquired hypothyroidism (Primary)  -     TSH+Free T4    2. Chronic hepatitis C without hepatic coma (HCC)    3. Primary hypertension    4. Dyslipidemia    5. Coronary artery disease involving native coronary artery of native heart without angina pectoris    6. Anxiety      Fu labs and adjust thryoid meds as needed  Keep apt with GI  Keep appt with cardiology    Refill meds as needed  Stop smoking  Smoking cessation recommended for good health  Weight loss recommended for good health low carb, low louis diet  Increase exercise  Refill meds as needed

## 2022-04-18 RX ORDER — LOSARTAN POTASSIUM 100 MG/1
TABLET ORAL
Qty: 45 TABLET | Refills: 0 | Status: SHIPPED | OUTPATIENT
Start: 2022-04-18 | End: 2022-07-19

## 2022-04-18 NOTE — TELEPHONE ENCOUNTER
Rx Refill Note    PROTOCOLS NOT MET. PATIENT SEES DR. CURTIS.     Requested Prescriptions     Pending Prescriptions Disp Refills   • losartan (COZAAR) 100 MG tablet [Pharmacy Med Name: Losartan Potassium 100 MG Oral Tablet] 45 tablet 0     Sig: Take 1 tablet by mouth once daily      Last office visit with prescribing clinician: 4/5/2022      Next office visit with prescribing clinician: Visit date not found     Comprehensive metabolic panel (01/04/2022 10:12)         Mell Cain LPN  04/18/22, 17:09 EDT

## 2022-04-20 RX ORDER — LEVOTHYROXINE SODIUM 0.03 MG/1
TABLET ORAL
Qty: 114 TABLET | Refills: 1 | Status: SHIPPED | OUTPATIENT
Start: 2022-04-20 | End: 2022-07-19

## 2022-04-27 RX ORDER — POTASSIUM CHLORIDE 1500 MG/1
TABLET, EXTENDED RELEASE ORAL
Qty: 90 TABLET | Refills: 1 | Status: SHIPPED | OUTPATIENT
Start: 2022-04-27 | End: 2023-02-16

## 2022-04-27 RX ORDER — ATORVASTATIN CALCIUM 10 MG/1
10 TABLET, FILM COATED ORAL
Qty: 30 TABLET | Refills: 0 | Status: SHIPPED | OUTPATIENT
Start: 2022-04-27 | End: 2022-08-15

## 2022-04-27 NOTE — TELEPHONE ENCOUNTER
Rx Refill Note  Requested Prescriptions      No prescriptions requested or ordered in this encounter      Last office visit with prescribing clinician: 11/3/2020      Next office visit with prescribing clinician: 5/3/2022            Sue Quiroz MA  04/27/22, 12:10 EDT

## 2022-04-27 NOTE — TELEPHONE ENCOUNTER
Rx Refill Note  Requested Prescriptions     Pending Prescriptions Disp Refills   • KLOR-CON 20 MEQ CR tablet [Pharmacy Med Name: KLOR-CON M20 TABLET] 30 tablet 0     Sig: TAKE 1 TABLET BY MOUTH EVERY DAY      Last office visit with prescribing clinician: 11/3/2020      Next office visit with prescribing clinician: 5/3/2022            April Leyva MA  04/27/22, 14:15 EDT

## 2022-05-02 ENCOUNTER — OFFICE VISIT (OUTPATIENT)
Dept: FAMILY MEDICINE CLINIC | Facility: CLINIC | Age: 47
End: 2022-05-02

## 2022-05-02 VITALS
WEIGHT: 293 LBS | HEART RATE: 72 BPM | SYSTOLIC BLOOD PRESSURE: 118 MMHG | BODY MASS INDEX: 48.82 KG/M2 | OXYGEN SATURATION: 97 % | HEIGHT: 65 IN | DIASTOLIC BLOOD PRESSURE: 68 MMHG | TEMPERATURE: 97.2 F

## 2022-05-02 DIAGNOSIS — Z76.89 ESTABLISHING CARE WITH NEW DOCTOR, ENCOUNTER FOR: Primary | ICD-10-CM

## 2022-05-02 DIAGNOSIS — I10 ESSENTIAL HYPERTENSION: ICD-10-CM

## 2022-05-02 DIAGNOSIS — E78.5 DYSLIPIDEMIA: ICD-10-CM

## 2022-05-02 PROCEDURE — 99213 OFFICE O/P EST LOW 20 MIN: CPT | Performed by: NURSE PRACTITIONER

## 2022-05-02 NOTE — ASSESSMENT & PLAN NOTE
Hypertension is improved with treatment and stable.  Continue current treatment regimen.  Weight loss.  Regular aerobic exercise.  Stop smoking.  Blood pressure will be reassessed in 3 months.

## 2022-05-02 NOTE — ASSESSMENT & PLAN NOTE
Patient establishing care today.    Provider from [facility] sent patient with lab orders today for CBC, CMP and hep C RNA quantitative.    No additional labs being drawn today.  Patient can return in 3 months for follow-up of chronic medical conditions.

## 2022-05-02 NOTE — PROGRESS NOTES
"Chief Complaint  Depression, Hypothyroidism, and Hypertension    Subjective          Odalis Kaur presents to [facility]      History of Present Illness    Odalis is a 46-year-old female patient who presents today to establish care as her former provider Dr. Zarate but is no longer with our practice.  She last saw Dr. Zarate 3 to 4 weeks ago and was informed to follow-up at a 3-month visit.    She does have a history of CAD dyslipidemia, obesity, anxiety, depression, hepatitis C, hypertension, stroke, hypothyroidism, and MICHELE.  She is a smoker and smokes 1 pack/day but she does not drink any alcohol.  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    She goes to [facility] for [facility]necological checks. Her last mammogram was performed at [facility].     She has a family history of CAD, MI, thyroid disease, hypertension, cancer unknown, stroke.  Her parents are living.  She has 2 children ages 20 and 22.    /69.  She is well controlled on Norvasc 10 mg daily and hydrochlorothiazide 12.5 daily.  Additionally, she does take losartan milligrams and metoprolol 25 mg.  She denies any chest pain, palpitations, dizziness, visual disturbances, headaches.    She sees Therese Contreras, [facility]. She is on her second round of treatment for Hepatitis C. She has ordered labs today for her.     Her thyroid is stable and was checked in April. She is taking 25 mcg of synthroid.         Objective     Vital Signs:   /68 (BP Location: Right arm, Patient Position: Sitting, Cuff Size: Large Adult)   Pulse 72   Temp 97.2 °F (36.2 °C) (Infrared)   Ht 165.1 cm (65\")   Wt 136 kg (299 lb 12.8 oz)   SpO2 97%   BMI 49.89 kg/m²           Physical Exam  Vitals reviewed.   Constitutional:       Appearance: She is well-developed. She is obese.      Comments: Wearing a face mask     HENT:      Head: Normocephalic and atraumatic.      Right Ear: Tympanic membrane, ear canal and external ear " normal. There is no impacted cerumen.      Left Ear: Tympanic membrane and external ear normal. There is no impacted cerumen.      Nose: Nose normal. No congestion or rhinorrhea.      Mouth/Throat:      Mouth: Mucous membranes are moist.      Pharynx: Oropharynx is clear. No oropharyngeal exudate or posterior oropharyngeal erythema.   Eyes:      Conjunctiva/sclera: Conjunctivae normal.   Neck:      Thyroid: No thyroid mass, thyromegaly or thyroid tenderness.   Cardiovascular:      Rate and Rhythm: Normal rate and regular rhythm.      Pulses: Normal pulses.      Heart sounds: Normal heart sounds.   Pulmonary:      Effort: Pulmonary effort is normal. No respiratory distress.      Breath sounds: Normal breath sounds.   Abdominal:      General: Abdomen is protuberant. Bowel sounds are normal.      Palpations: Abdomen is soft.   Musculoskeletal:         General: Normal range of motion.      Cervical back: Normal range of motion and neck supple. No tenderness.   Lymphadenopathy:      Head:      Right side of head: No submental, submandibular, tonsillar, preauricular, posterior auricular or occipital adenopathy.      Left side of head: No submental, submandibular, tonsillar, preauricular, posterior auricular or occipital adenopathy.      Cervical: No cervical adenopathy.   Skin:     General: Skin is warm and dry.      Findings: No rash.   Neurological:      Mental Status: She is alert and oriented to person, place, and time.   Psychiatric:         Behavior: Behavior normal.                Result Review :                                   Assessment and Plan      Diagnoses and all orders for this visit:    1. Establishing care with new doctor, encounter for (Primary)  Assessment & Plan:  Patient establishing care today.    Provider from [facility] sent patient with lab orders today for CBC, CMP and hep C RNA quantitative.    No additional labs being drawn today.  Patient can return in 3 months for follow-up of chronic medical  conditions.      2. Essential hypertension  Assessment & Plan:  Hypertension is improved with treatment and stable.  Continue current treatment regimen.  Weight loss.  Regular aerobic exercise.  Stop smoking.  Blood pressure will be reassessed in 3 months.      3. Dyslipidemia          Follow Up       Return in about 3 months (around 8/2/2022) for with KATI Phipps.      Patient was given instructions and counseling regarding her condition or for health maintenance advice. Please see specific information pulled into the AVS if appropriate.     Margarita Smith, APRN5/2/202210:40 EDT  This note has been electronically signed

## 2022-05-05 ENCOUNTER — PATIENT ROUNDING (BHMG ONLY) (OUTPATIENT)
Dept: FAMILY MEDICINE CLINIC | Facility: CLINIC | Age: 47
End: 2022-05-05

## 2022-05-12 RX ORDER — CLOPIDOGREL BISULFATE 75 MG/1
75 TABLET ORAL DAILY
Qty: 30 TABLET | Refills: 0 | Status: SHIPPED | OUTPATIENT
Start: 2022-05-12 | End: 2022-06-16

## 2022-05-12 NOTE — TELEPHONE ENCOUNTER
Rx Refill Note  Requested Prescriptions     Pending Prescriptions Disp Refills   • clopidogrel (PLAVIX) 75 MG tablet [Pharmacy Med Name: CLOPIDOGREL 75 MG TABLET] 30 tablet 0     Sig: TAKE 1 TABLET BY MOUTH DAILY. MUST SCHEDULE APPT FOR FURTHER REFILLS.      Last office visit with prescribing clinician: 11/3/2020      Next office visit with prescribing clinician: 6/7/2022            ISAURA SHRESTHA MA  05/12/22, 12:49 EDT

## 2022-05-13 RX ORDER — HYDROCHLOROTHIAZIDE 12.5 MG/1
CAPSULE, GELATIN COATED ORAL
Qty: 30 CAPSULE | Refills: 1 | Status: SHIPPED | OUTPATIENT
Start: 2022-05-13 | End: 2022-06-07

## 2022-05-13 RX ORDER — ESCITALOPRAM OXALATE 10 MG/1
TABLET ORAL
Qty: 90 TABLET | Refills: 0 | Status: SHIPPED | OUTPATIENT
Start: 2022-05-13 | End: 2022-07-19

## 2022-06-07 ENCOUNTER — OFFICE VISIT (OUTPATIENT)
Dept: CARDIOLOGY | Facility: CLINIC | Age: 47
End: 2022-06-07

## 2022-06-07 VITALS
BODY MASS INDEX: 48.82 KG/M2 | DIASTOLIC BLOOD PRESSURE: 88 MMHG | HEART RATE: 64 BPM | SYSTOLIC BLOOD PRESSURE: 159 MMHG | WEIGHT: 293 LBS | HEIGHT: 65 IN | OXYGEN SATURATION: 99 %

## 2022-06-07 DIAGNOSIS — I25.10 CORONARY ARTERY DISEASE INVOLVING NATIVE CORONARY ARTERY OF NATIVE HEART WITHOUT ANGINA PECTORIS: ICD-10-CM

## 2022-06-07 DIAGNOSIS — E78.5 DYSLIPIDEMIA: ICD-10-CM

## 2022-06-07 DIAGNOSIS — Z86.73 HISTORY OF CVA (CEREBROVASCULAR ACCIDENT): ICD-10-CM

## 2022-06-07 DIAGNOSIS — R79.89 ELEVATED LFTS: ICD-10-CM

## 2022-06-07 DIAGNOSIS — E66.01 MORBID OBESITY WITH BMI OF 45.0-49.9, ADULT: ICD-10-CM

## 2022-06-07 DIAGNOSIS — I10 ESSENTIAL HYPERTENSION: Primary | ICD-10-CM

## 2022-06-07 DIAGNOSIS — I65.23 BILATERAL CAROTID ARTERY OCCLUSION: ICD-10-CM

## 2022-06-07 PROCEDURE — 93000 ELECTROCARDIOGRAM COMPLETE: CPT | Performed by: INTERNAL MEDICINE

## 2022-06-07 PROCEDURE — 99214 OFFICE O/P EST MOD 30 MIN: CPT | Performed by: INTERNAL MEDICINE

## 2022-06-07 RX ORDER — BISOPROLOL FUMARATE AND HYDROCHLOROTHIAZIDE 10; 6.25 MG/1; MG/1
1 TABLET ORAL DAILY
Qty: 90 TABLET | Refills: 3 | Status: SHIPPED | OUTPATIENT
Start: 2022-06-07

## 2022-06-07 NOTE — PROGRESS NOTES
Subjective:     Encounter Date:06/07/2022      Patient ID: Odalis Kaur is a 46 y.o. female.    Chief Complaint : Follow-up for CAD, hypertension, dyslipidemia, PAD  History of Present Illness        Ms. Odalis Kaur has PMH of      # CAD, cath 4/10/19 , ostial LAD 50% and 70% D1  #  hyperlipidemia, hypertension  # PAD, 100% TELMA  # CVA / Stroke  RMCA / watershed CVA w/LUE, left lip & slight facial droop 100% occlusion of TELMA  #  history of DVT  #   hep C,B12 deficiency,depression  #   MICHELE,pulmonary nodule,  #  tubal ligation, tonsillectomy  #  cigarette smoker  #  positive family history of hypertension and mother and cancer and grand parents  #  allergy/intolerance to iron infusions     here for follow-up..  Patient denies any chest pain or shortness of breath. patient is not very active because of previous CVA and left hemiplegia. her leg still drags and she cannot walk fast and her left arm strength is not completely back to normal.  Patient has dyspnea on exertion class I relieved with rest..    Patient's arterial blood pressure is 159/88, heart rate 64, O2 sat of 99% on room air, BMI is over 49    Patient is not on statins. her labs from 9/14/16 revealed cholesterol 136 HDL low at 22 LDL not at goal at 85. Labs from 9/9/2020 reveal cholesterol 133 triglycerides 178 HDL low at 31 LDL 79, CMP with elevated ALT at 73, AST at 55.  Labs from 1/4/2022 reveal CMP with ALT of 50, AST 41.  Lipid profile with cholesterol 158, triglycerides 116, HDL 34,          ASSESSMENT:    #.  Hypertension  #  CAD  #  history of CVA, residual left hemiplegia, 100% carotid occlusion  #  dyslipidemia, obesity, cigarette smoker  #. Elevated LFTs, history of hep C    PLAN:    Reviewed EKG results with patient  Patient's blood pressure is not controlled.  Will discontinue hydrochlorothiazide and metoprolol started on bisoprolol hydrochlorothiazide.  Continue medical management with aspirin, amlodipine, atorvastatin,  clopidogrel,  losartan, bisoprolol hydrochlorothiazide and KCl as tolerated.  Risk benefits alternatives explained  Counseled on smoking cessation.  Reviewed BMI over 49, counseled on weight loss diet and exercise  Patient is following with GI Dr. Burden for hep C treatment .  We will send her to hematology to rule out hypercoagulable state.  Patient was only 41 when she had a CVA.  Reportedly had hypercoagulability work-up at the time.  We will repeat again.  Advised patient to check blood pressure at home.      ECG 12 Lead    Date/Time: 6/7/2022 12:03 PM  Performed by: Shay Muller MD  Authorized by: Shay Muller MD   Comparison: compared with previous ECG from 11/3/2020  Comparison to previous ECG: EKG done today reviewed/interpreted by me reveals sinus rhythm with rate of 60 bpm, no new change compared to EKG from 11/3/2020              The following portions of the patient's history were reviewed and updated as appropriate: allergies, current medications, past family history, past medical history, past social history, past surgical history and problem list.    Assessment:         MDM     Diagnosis Plan   1. Essential hypertension  Ambulatory Referral to Hematology   2. Dyslipidemia  Ambulatory Referral to Hematology   3. Coronary artery disease involving native coronary artery of native heart without angina pectoris  Ambulatory Referral to Hematology   4. Bilateral carotid artery occlusion  Ambulatory Referral to Hematology   5. History of CVA (cerebrovascular accident)  Ambulatory Referral to Hematology   6. Morbid obesity with BMI of 45.0-49.9, adult (HCC)  Ambulatory Referral to Hematology   7. Elevated LFTs  Ambulatory Referral to Hematology          Plan:               Past Medical History:  Past Medical History:   Diagnosis Date   • Adnexal cyst     LESION   • Anxiety    • B12 deficiency    • Coronary artery disease    • Depression    • Hepatitis C     TREATMENT PENDING GI   •  Hyperlipidemia    • Hypertension    • Hypothyroidism    • Obesity    • MICHELE (obstructive sleep apnea)    • PAD (peripheral artery disease) (HCC)    • Paranasal sinus disease    • Personal history of DVT (deep vein thrombosis)    • Pulmonary nodule    • Snoring    • Stroke (HCC)     LEFT LIP AND SLIGHT FACIAL DROOP 100% OCCLUSION OF  TELMA   • Tobacco use    • UTI (urinary tract infection)      Past Surgical History:  Past Surgical History:   Procedure Laterality Date   •  SECTION     • CHOLECYSTECTOMY     • ENDOMETRIAL ABLATION     • HYSTEROSCOPY ENDOMETRIAL ABLATION     • TONSILLECTOMY     • TUBAL ABDOMINAL LIGATION Bilateral       Allergies:  Allergies   Allergen Reactions   • Iron Hives     INFUSION AND INJECTIONS ONLY      Home Meds:  Current Meds:     Current Outpatient Medications:   •  amLODIPine (NORVASC) 10 MG tablet, TAKE 1 TABLET BY MOUTH EVERY DAY, Disp: 90 tablet, Rfl: 1  •  aspirin 81 MG chewable tablet, Chew 81 mg Daily., Disp: , Rfl:   •  atorvastatin (LIPITOR) 10 MG tablet, Take 1 tablet by mouth every night at bedtime., Disp: 30 tablet, Rfl: 0  •  busPIRone (BUSPAR) 15 MG tablet, TAKE 1 TABLET BY MOUTH THREE TIMES A DAY (Patient taking differently: Take 15 mg by mouth Daily.), Disp: 270 tablet, Rfl: 1  •  Cholecalciferol (Vitamin D3) 50 MCG (2000 UT) capsule, Take 2,000 Units by mouth Daily., Disp: , Rfl:   •  clopidogrel (PLAVIX) 75 MG tablet, TAKE 1 TABLET BY MOUTH DAILY. MUST SCHEDULE APPT FOR FURTHER REFILLS., Disp: 30 tablet, Rfl: 0  •  escitalopram (LEXAPRO) 10 MG tablet, TAKE 1 TABLET BY MOUTH EVERY DAY, Disp: 90 tablet, Rfl: 0  •  KLOR-CON 20 MEQ CR tablet, TAKE 1 TABLET BY MOUTH EVERY DAY, Disp: 90 tablet, Rfl: 1  •  levothyroxine (SYNTHROID, LEVOTHROID) 25 MCG tablet, Take 2 tablets on  Sat and Sun, 1 tablet Monday to friday, Disp: 114 tablet, Rfl: 1  •  losartan (COZAAR) 100 MG tablet, Take 1 tablet by mouth once daily, Disp: 45 tablet, Rfl: 0  •  Sofosbuvir-Velpatasvir 400-100 MG  "tablet, Take 1 tablet by mouth Daily., Disp: , Rfl:   •  bisoprolol-hydrochlorothiazide (ZIAC) 10-6.25 MG per tablet, Take 1 tablet by mouth Daily., Disp: 90 tablet, Rfl: 3  Social History:   Social History     Tobacco Use   • Smoking status: Current Every Day Smoker     Packs/day: 0.50     Years: 0.00     Pack years: 0.00     Types: Cigarettes     Start date: 1998   • Smokeless tobacco: Never Used   • Tobacco comment: REFUSES NICOTINE PATCHES, SMOKING CESSATION, CHANTIX, ECT.    Substance Use Topics   • Alcohol use: No      Family History:  Family History   Problem Relation Age of Onset   • Stroke Mother    • Heart disease Mother    • Hypertension Mother    • Thyroid disease Father    • No Known Problems Daughter    • No Known Problems Son    • Cancer Maternal Grandmother    • Bone cancer Maternal Grandmother    • Brain cancer Maternal Grandmother    • Prostate cancer Maternal Grandfather               ROS  All other systems are negative         Objective:     Physical Exam  /88   Pulse 64   Ht 165.1 cm (65\")   Wt 136 kg (299 lb)   SpO2 99%   BMI 49.76 kg/m²   General:  Appears in no acute distress, pleasant obese  Eyes: Sclera is anicteric,  conjunctiva is clear   HEENT:  No JVD.  No carotid bruits  Respiratory: Respirations regular and unlabored at rest.  Clear to auscultation  Cardiovascular: S1,S2 Regular rate and rhythm. No murmur, rub or gallop auscultated.   Extremities: No digital clubbing or cyanosis, no edema  Skin: Color pink. Skin warm and dry to touch. No rashes  No xanthoma  Neuro: Alert and awake.    Lab Reviewed:         Shay Muller MD  6/7/2022 12:12 EDT      EMR Dragon/Transcription:   \"Dictated utilizing Dragon dictation\".        "

## 2022-06-09 ENCOUNTER — TELEPHONE (OUTPATIENT)
Dept: ONCOLOGY | Facility: CLINIC | Age: 47
End: 2022-06-09

## 2022-06-16 RX ORDER — CLOPIDOGREL BISULFATE 75 MG/1
75 TABLET ORAL DAILY
Qty: 90 TABLET | Refills: 1 | Status: SHIPPED | OUTPATIENT
Start: 2022-06-16 | End: 2023-02-16

## 2022-06-16 NOTE — TELEPHONE ENCOUNTER
Rx Refill Note  Requested Prescriptions     Pending Prescriptions Disp Refills   • clopidogrel (PLAVIX) 75 MG tablet [Pharmacy Med Name: CLOPIDOGREL 75 MG TABLET] 30 tablet 0     Sig: TAKE 1 TABLET BY MOUTH DAILY. MUST SCHEDULE APPT FOR FURTHER REFILLS.      Last office visit with prescribing clinician: 6/7/2022      Next office visit with prescribing clinician: 12/6/2022            Sue Quiroz MA  06/16/22, 10:12 EDT

## 2022-06-21 ENCOUNTER — CONSULT (OUTPATIENT)
Dept: ONCOLOGY | Facility: CLINIC | Age: 47
End: 2022-06-21

## 2022-06-21 VITALS
TEMPERATURE: 97.1 F | OXYGEN SATURATION: 98 % | WEIGHT: 293 LBS | HEIGHT: 65 IN | DIASTOLIC BLOOD PRESSURE: 84 MMHG | SYSTOLIC BLOOD PRESSURE: 156 MMHG | HEART RATE: 67 BPM | BODY MASS INDEX: 48.82 KG/M2

## 2022-06-21 DIAGNOSIS — I65.23 BILATERAL CAROTID ARTERY OCCLUSION: ICD-10-CM

## 2022-06-21 DIAGNOSIS — I25.10 CHRONIC CORONARY ARTERY DISEASE: Primary | ICD-10-CM

## 2022-06-21 PROCEDURE — 99204 OFFICE O/P NEW MOD 45 MIN: CPT | Performed by: INTERNAL MEDICINE

## 2022-06-21 NOTE — PROGRESS NOTES
HEMATOLOGY ONCOLOGY OUTPATIENT CONSULTATION       Patient name: Odalis Kaur  : 1975  MRN: [id number]  Primary Care Physician: Margarita Smith APRN  Referring Physician: Shay Muller*  Reason For Consult:     Chief Complaint   Patient presents with   • Appointment     Bilateral carotid artery occlusion     HPI:   History of Present Illness:  Odalis Kaur is 46 y.o. female who presented to the office on 22 for consultation regarding    2022: Ms. Kaur came referred by her cardiologist, Dr. Muller, to investigate a history of early age stroke. She reported that approximately 4 years before at the age of 42, she woke up one day with inability to move the left side of the body; she was noted to have speech difficulties. She was admitted to the hospital and received in-patient treatment for approximately one week. At the time she was consuming approximately 2 packs of cigarettes per day and was using methamphetamine. She eventually received physical therapy and recovered mobility almost completely. She recovered her speech but persisted with some memory problems. She was found to have atherosclerotic vascular disease, involving both carotids and apparently had complete obstruction of the internal carotid on the right. Later she was also found to have coronary artery disease. She reported no history of deep vein thrombosis. She also did not have a family history of deep vein thrombosis but she believed that her mother had suffered a stroke in her early 50's. A decision was made to investigate further, but her stroke was felt to be the result of a combination of genetic predisposition, tobacco smoking, use of methamphetamine, hypertension and morbid obesity.     Subjective:  • 22.  In the office for the initial visit with the above history. At this time she feels reasonably well. She has not returned to work since the time of the stroke. She no longer  uses methamphetamine but she continues to smoke consuming approximately one half pack per day. She has been coughing some but not particularly and she is not more dyspneic than before. No substernal chest pain. No gastrointestinal changes and no weight loss. No edema. No skin rash.     The following portions of the patient's history were reviewed and updated as appropriate: allergies, current medications, past family history, past medical history, past social history, past surgical history and problem list.    Past Medical History:   Diagnosis Date   • Adnexal cyst     LESION   • Anxiety    • B12 deficiency    • Coronary artery disease    • Depression    • Hepatitis C     TREATMENT PENDING GI   • Hyperlipidemia    • Hypertension    • Hypothyroidism    • Obesity    • MICHELE (obstructive sleep apnea)    • PAD (peripheral artery disease) (HCC)    • Paranasal sinus disease    • Pulmonary nodule    • Snoring    • Stroke (HCC)     LEFT LIP AND SLIGHT FACIAL DROOP 100% OCCLUSION OF  TELMA   • Tobacco use    • UTI (urinary tract infection)      Past Surgical History:   Procedure Laterality Date   •  SECTION     • CHOLECYSTECTOMY     • ENDOMETRIAL ABLATION     • HYSTEROSCOPY ENDOMETRIAL ABLATION     • TONSILLECTOMY     • TUBAL ABDOMINAL LIGATION Bilateral        Current Outpatient Medications:   •  amLODIPine (NORVASC) 10 MG tablet, TAKE 1 TABLET BY MOUTH EVERY DAY, Disp: 90 tablet, Rfl: 1  •  aspirin 81 MG chewable tablet, Chew 81 mg Daily., Disp: , Rfl:   •  atorvastatin (LIPITOR) 10 MG tablet, Take 1 tablet by mouth every night at bedtime., Disp: 30 tablet, Rfl: 0  •  bisoprolol-hydrochlorothiazide (ZIAC) 10-6.25 MG per tablet, Take 1 tablet by mouth Daily., Disp: 90 tablet, Rfl: 3  •  busPIRone (BUSPAR) 15 MG tablet, TAKE 1 TABLET BY MOUTH THREE TIMES A DAY (Patient taking differently: Take 15 mg by mouth Daily.), Disp: 270 tablet, Rfl: 1  •  Cholecalciferol (Vitamin D3) 50 MCG (2000 UT) capsule, Take 2,000 Units by  mouth Daily., Disp: , Rfl:   •  clopidogrel (PLAVIX) 75 MG tablet, TAKE 1 TABLET BY MOUTH DAILY. MUST SCHEDULE APPT FOR FURTHER REFILLS., Disp: 90 tablet, Rfl: 1  •  escitalopram (LEXAPRO) 10 MG tablet, TAKE 1 TABLET BY MOUTH EVERY DAY, Disp: 90 tablet, Rfl: 0  •  KLOR-CON 20 MEQ CR tablet, TAKE 1 TABLET BY MOUTH EVERY DAY, Disp: 90 tablet, Rfl: 1  •  levothyroxine (SYNTHROID, LEVOTHROID) 25 MCG tablet, Take 2 tablets on  Sat and Sun, 1 tablet Monday to friday, Disp: 114 tablet, Rfl: 1  •  losartan (COZAAR) 100 MG tablet, Take 1 tablet by mouth once daily, Disp: 45 tablet, Rfl: 0  •  Sofosbuvir-Velpatasvir 400-100 MG tablet, Take 1 tablet by mouth Daily., Disp: , Rfl:     Allergies   Allergen Reactions   • Iron Hives     INFUSION AND INJECTIONS ONLY      Family History   Problem Relation Age of Onset   • Stroke Mother    • Heart disease Mother    • Hypertension Mother    • Thyroid disease Father    • No Known Problems Daughter    • No Known Problems Son    • Cancer Maternal Grandmother    • Bone cancer Maternal Grandmother    • Brain cancer Maternal Grandmother    • Prostate cancer Maternal Grandfather      Cancer-related family history includes Bone cancer in her maternal grandmother; Brain cancer in her maternal grandmother; Cancer in her maternal grandmother; Prostate cancer in her maternal grandfather.    Social History     Tobacco Use   • Smoking status: Current Every Day Smoker     Packs/day: 2.00     Years: 24.00     Pack years: 48.00     Types: Cigarettes     Start date: 1998   • Smokeless tobacco: Never Used   • Tobacco comment: REFUSES NICOTINE PATCHES, SMOKING CESSATION, CHANTIX, ECT.    Vaping Use   • Vaping Use: Never used   Substance Use Topics   • Alcohol use: No   • Drug use: Not Currently     Types: Methamphetamines     Social History     Social History Narrative   • Not on file      ROS:     Review of Systems   Constitutional: Positive for fatigue. Negative for activity change, appetite change,  "chills, diaphoresis, fever and unexpected weight change.   HENT: Negative for congestion, dental problem, drooling, ear discharge, ear pain, facial swelling, hearing loss, mouth sores, nosebleeds, postnasal drip, rhinorrhea, sinus pressure, sinus pain, sneezing, sore throat, tinnitus, trouble swallowing and voice change.    Eyes: Negative for photophobia, pain, discharge, redness, itching and visual disturbance.   Respiratory: Positive for cough and shortness of breath. Negative for apnea, choking, chest tightness, wheezing and stridor.    Cardiovascular: Negative for chest pain, palpitations and leg swelling.   Gastrointestinal: Negative for abdominal distention, abdominal pain, anal bleeding, blood in stool, constipation, diarrhea, nausea, rectal pain and vomiting.   Endocrine: Negative for cold intolerance, heat intolerance, polydipsia and polyuria.   Genitourinary: Negative for decreased urine volume, difficulty urinating, dysuria, flank pain, frequency, genital sores, hematuria and urgency.   Musculoskeletal: Negative for arthralgias, back pain, gait problem, joint swelling, myalgias, neck pain and neck stiffness.   Skin: Negative for color change, pallor and rash.   Neurological: Negative for dizziness, tremors, seizures, syncope, facial asymmetry, speech difficulty, weakness, light-headedness, numbness and headaches.   Hematological: Negative for adenopathy. Does not bruise/bleed easily.   Psychiatric/Behavioral: Negative for agitation, behavioral problems, confusion, decreased concentration, hallucinations, self-injury, sleep disturbance and suicidal ideas. The patient is not nervous/anxious.      Objective:    Vitals:    06/21/22 1119   BP: 156/84   Pulse: 67   Temp: 97.1 °F (36.2 °C)   SpO2: 98%   Weight: (!) 143 kg (316 lb)   Height: 165.1 cm (65\")   PainSc: 0-No pain     Body mass index is 52.59 kg/m².  ECOG  (1) Restricted in physically strenuous activity, ambulatory and able to do work of light " nature    Physical Exam:     Physical Exam  Constitutional:       General: She is not in acute distress.     Appearance: She is obese. She is not ill-appearing, toxic-appearing or diaphoretic.   HENT:      Head: Normocephalic and atraumatic.      Right Ear: External ear normal.      Left Ear: External ear normal.      Nose: Nose normal.      Mouth/Throat:      Mouth: Mucous membranes are moist.      Pharynx: Oropharynx is clear. No oropharyngeal exudate or posterior oropharyngeal erythema.   Eyes:      General: No scleral icterus.        Right eye: No discharge.         Left eye: No discharge.      Conjunctiva/sclera: Conjunctivae normal.      Pupils: Pupils are equal, round, and reactive to light.   Cardiovascular:      Rate and Rhythm: Normal rate and regular rhythm.      Pulses: Normal pulses.      Heart sounds: No murmur heard.    No friction rub. No gallop.   Pulmonary:      Effort: No respiratory distress.      Breath sounds: No stridor. No wheezing, rhonchi or rales.      Comments: Breath sounds diminished bilaterally but clear to auscultation.  Abdominal:      General: Abdomen is flat. Bowel sounds are normal. There is no distension.      Palpations: Abdomen is soft. There is no mass.      Tenderness: There is no abdominal tenderness. There is no right CVA tenderness, left CVA tenderness, guarding or rebound.      Hernia: No hernia is present.   Musculoskeletal:         General: No swelling, tenderness, deformity or signs of injury.      Cervical back: No rigidity.      Right lower leg: No edema.      Left lower leg: No edema.   Lymphadenopathy:      Cervical: No cervical adenopathy.   Skin:     Coloration: Skin is not jaundiced.      Findings: No bruising, lesion or rash.   Neurological:      General: No focal deficit present.      Mental Status: She is alert and oriented to person, place, and time.      Cranial Nerves: No cranial nerve deficit.      Gait: Gait normal.   Psychiatric:         Mood and  Affect: Mood normal.         Behavior: Behavior normal.         Thought Content: Thought content normal.         Judgment: Judgment normal.     ZONIA Hernandez MD performed the physical exam on 6/21/2022 as documented above.     Lab Results - Last 18 Months   Lab Units 01/04/22  1012   WBC 10*3/mm3 5.06   HEMOGLOBIN g/dL 15.4   HEMATOCRIT % 44.6   PLATELETS 10*3/mm3 200   MCV fL 95.1     Lab Results - Last 18 Months   Lab Units 01/04/22  1012 07/28/21  1204   SODIUM mmol/L 141 136   POTASSIUM mmol/L 3.9 3.5   CHLORIDE mmol/L 102 97*   CO2 mmol/L 26.6 28.3   BUN mg/dL 15 10   CREATININE mg/dL 0.91 0.73   CALCIUM mg/dL 9.4 9.1   BILIRUBIN mg/dL 0.5 0.5   ALK PHOS U/L 82 88   ALT (SGPT) U/L 50* 24   AST (SGOT) U/L 41* 27   GLUCOSE mg/dL 81 46*     Lab Results   Component Value Date    GLUCOSE 81 01/04/2022    BUN 15 01/04/2022    CREATININE 0.91 01/04/2022    EGFRIFNONA 67 01/04/2022    BCR 16.5 01/04/2022    K 3.9 01/04/2022    CO2 26.6 01/04/2022    CALCIUM 9.4 01/04/2022    ALBUMIN 4.30 01/04/2022    LABIL2 1.1 08/01/2018    AST 41 (H) 01/04/2022    ALT 50 (H) 01/04/2022     Lab Results   Component Value Date    IRON 107 02/05/2020    TIBC 395 02/05/2020    FERRITIN 306.00 (H) 09/09/2020     Lab Results   Component Value Date    FOLATE >24.8 (H) 12/12/2017     Lab Results   Component Value Date    RETICCTPCT 1.95 (H) 12/12/2017     Lab Results   Component Value Date    PORPMMUC08 502 01/04/2022     Lab Results   Component Value Date    INR 1.0 04/10/2019     Assessment & Plan     Assessment:  1. Ischemic stroke. Reviewed the records available. Discussed with her and explained the factors that likely explain her stroke at such a young age. Her mother seems to have suffered an ischemic stroke at a relatively young age, which confirms an inherited predisposition. However, for the most part, ischemic stroke is the result of complex hereditary predisposition interacting with the environment. The environmental factors  participating in Ms. Kaur's case are her hypertension, very severe obesity, cigarette smoking and methamphetamine use at the time of the stroke. Lupus anticoagulant can participate, but her history is not consistent with this. However, this will be investigated.   2. Discussed with her the importance of smoking cessation. Explained the risks of continued usage of tobacco, including, worsening of her coronary artery disease, cerebrovascular disease, malignancy and early mortality.   3. She is to see me in the near future with results.     Plan:  1. As above.     Davian Hernandez MD on 6/21/2022 at 12:55 PM.

## 2022-07-19 RX ORDER — LEVOTHYROXINE SODIUM 0.03 MG/1
TABLET ORAL
Qty: 114 TABLET | Refills: 1 | Status: SHIPPED | OUTPATIENT
Start: 2022-07-19

## 2022-07-19 RX ORDER — ESCITALOPRAM OXALATE 10 MG/1
TABLET ORAL
Qty: 90 TABLET | Refills: 0 | Status: SHIPPED | OUTPATIENT
Start: 2022-07-19

## 2022-07-19 RX ORDER — AMLODIPINE BESYLATE 10 MG/1
TABLET ORAL
Qty: 90 TABLET | Refills: 1 | Status: SHIPPED | OUTPATIENT
Start: 2022-07-19 | End: 2023-02-16

## 2022-07-19 RX ORDER — HYDROCHLOROTHIAZIDE 12.5 MG/1
CAPSULE, GELATIN COATED ORAL
Qty: 30 CAPSULE | Refills: 1 | Status: SHIPPED | OUTPATIENT
Start: 2022-07-19 | End: 2022-08-26

## 2022-07-19 RX ORDER — LOSARTAN POTASSIUM 100 MG/1
TABLET ORAL
Qty: 90 TABLET | Refills: 1 | Status: SHIPPED | OUTPATIENT
Start: 2022-07-19

## 2022-08-02 ENCOUNTER — APPOINTMENT (OUTPATIENT)
Dept: ONCOLOGY | Facility: CLINIC | Age: 47
End: 2022-08-02

## 2022-08-15 RX ORDER — ATORVASTATIN CALCIUM 10 MG/1
TABLET, FILM COATED ORAL
Qty: 30 TABLET | Refills: 3 | Status: SHIPPED | OUTPATIENT
Start: 2022-08-15

## 2022-08-15 NOTE — TELEPHONE ENCOUNTER
Rx Refill Note  Requested Prescriptions     Pending Prescriptions Disp Refills   • atorvastatin (LIPITOR) 10 MG tablet [Pharmacy Med Name: ATORVASTATIN 10 MG TABLET] 30 tablet 0     Sig: TAKE 1 TABLET BY MOUTH EVERYDAY AT BEDTIME      Last office visit with prescribing clinician: 6/7/2022      Next office visit with prescribing clinician: 12/6/2022            Page NEHEMIAS Martinez  08/15/22, 10:45 EDT

## 2022-08-26 ENCOUNTER — TELEPHONE (OUTPATIENT)
Dept: FAMILY MEDICINE CLINIC | Facility: CLINIC | Age: 47
End: 2022-08-26

## 2022-08-26 DIAGNOSIS — R22.0 JAW SWELLING: Primary | ICD-10-CM

## 2022-08-26 DIAGNOSIS — K04.7 DENTAL ABSCESS: ICD-10-CM

## 2022-08-26 RX ORDER — AMOXICILLIN 500 MG/1
500 TABLET, FILM COATED ORAL 3 TIMES DAILY
Qty: 30 TABLET | Refills: 0 | Status: SHIPPED | OUTPATIENT
Start: 2022-08-26

## 2022-08-26 RX ORDER — HYDROCHLOROTHIAZIDE 12.5 MG/1
CAPSULE, GELATIN COATED ORAL
Qty: 30 CAPSULE | Refills: 1 | Status: SHIPPED | OUTPATIENT
Start: 2022-08-26

## 2022-08-26 NOTE — TELEPHONE ENCOUNTER
Caller: Odalis Nair    Relationship: Self    Best call back number: [phone]    What medication are you requesting: ANTIBIOTIC    What are your current symptoms: FACE IS SWOLLEN    How long have you been experiencing symptoms: SINCE YESTERDAY 8/25    Have you had these symptoms before:    [] Yes  [x] No    Have you been treated for these symptoms before:   [] Yes  [x] No    If a prescription is needed, what is your preferred pharmacy and phone number:    [id number] - [place], [place] - [address] - [phone]  - [phone]   [phone]  Associate Signed OrdersPatient EstimateProvidersCurrent Interactions      Additional notes: PATIENT IS UNABLE TO GET INTO DENTIST TILL November.

## 2022-08-26 NOTE — TELEPHONE ENCOUNTER
Please please notify patient that I have sent in amoxicillin to be taken 3 times daily for the next 10 days for her swelling and dental pain.  If she has any trouble breathing, high fevers, other concerns I do advise her to go to the ER as dental infections can become quite significant.

## 2023-02-16 RX ORDER — CLOPIDOGREL BISULFATE 75 MG/1
75 TABLET ORAL DAILY
Qty: 90 TABLET | Refills: 1 | Status: SHIPPED | OUTPATIENT
Start: 2023-02-16

## 2023-02-16 RX ORDER — AMLODIPINE BESYLATE 10 MG/1
TABLET ORAL
Qty: 90 TABLET | Refills: 1 | Status: SHIPPED | OUTPATIENT
Start: 2023-02-16

## 2023-02-16 RX ORDER — POTASSIUM CHLORIDE 1500 MG/1
TABLET, EXTENDED RELEASE ORAL
Qty: 90 TABLET | Refills: 1 | Status: SHIPPED | OUTPATIENT
Start: 2023-02-16

## 2023-02-16 NOTE — TELEPHONE ENCOUNTER
Rx Refill Note  Requested Prescriptions     Pending Prescriptions Disp Refills   • KLOR-CON 20 MEQ CR tablet [Pharmacy Med Name: KLOR-CON M20 TABLET] 90 tablet 1     Sig: TAKE 1 TABLET BY MOUTH EVERY DAY   • clopidogrel (PLAVIX) 75 MG tablet [Pharmacy Med Name: CLOPIDOGREL 75 MG TABLET] 90 tablet 1     Sig: TAKE 1 TABLET BY MOUTH DAILY. MUST SCHEDULE APPT FOR FURTHER REFILLS.      Last office visit with prescribing clinician: 6/7/2022   Last telemedicine visit with prescribing clinician: 3/7/2023   Next office visit with prescribing clinician: 3/7/2023                         Would you like a call back once the refill request has been completed: [] Yes [] No    If the office needs to give you a call back, can they leave a voicemail: [] Yes [] No    Adriana Pearce MA  02/16/23, 09:41 EST

## 2023-03-01 ENCOUNTER — TELEPHONE (OUTPATIENT)
Dept: CARDIOLOGY | Facility: CLINIC | Age: 48
End: 2023-03-01
Payer: MEDICAID

## 2023-06-02 NOTE — TELEPHONE ENCOUNTER
Caller: Odalis Nair    Relationship: Self    Best call back number: [phone]    Requested Prescriptions:   Requested Prescriptions     Pending Prescriptions Disp Refills   • escitalopram (LEXAPRO) 10 MG tablet 90 tablet 0     Sig: Take 1 tablet by mouth Daily.        Pharmacy where request should be sent: [id number] - [place], [place] - [address] [facility] [phone] [facility] [phone] FX     Last office visit with prescribing clinician: 5/2/2022   Last telemedicine visit with prescribing clinician: Visit date not found   Next office visit with prescribing clinician: Visit date not found     Additional details provided by patient:NO  Does the patient have less than a 3 day supply:  [x] Yes  [] No    Would you like a call back once the refill request has been completed: [x] Yes [] No    If the office needs to give you a call back, can they leave a voicemail: [x] Yes [] No    Virgilio Linda Rep   06/02/23 13:25 EDT

## 2023-06-05 RX ORDER — ESCITALOPRAM OXALATE 10 MG/1
10 TABLET ORAL DAILY
Qty: 90 TABLET | Refills: 0 | Status: SHIPPED | OUTPATIENT
Start: 2023-06-05

## 2023-09-05 ENCOUNTER — NURSE TRIAGE (OUTPATIENT)
Dept: CALL CENTER | Facility: HOSPITAL | Age: 48
End: 2023-09-05
Payer: MEDICAID

## 2023-09-05 NOTE — TELEPHONE ENCOUNTER
"Reason for Disposition   [1] MODERATE dizziness (e.g., interferes with normal activities) AND [2] has NOT been evaluated by doctor (or NP/PA) for this  (Exception: Dizziness caused by heat exposure, sudden standing, or poor fluid intake.)    Additional Information   Negative: SEVERE difficulty breathing (e.g., struggling for each breath, speaks in single words)   Negative: [1] Difficulty breathing or swallowing AND [2] started suddenly after medicine, an allergic food or bee sting   Negative: Shock suspected (e.g., cold/pale/clammy skin, too weak to stand, low BP, rapid pulse)   Negative: Difficult to awaken or acting confused (e.g., disoriented, slurred speech)   Negative: [1] Weakness (i.e., paralysis, loss of muscle strength) of the face, arm or leg on one side of the body AND [2] sudden onset AND [3] present now   Negative: [1] Numbness (i.e., loss of sensation) of the face, arm or leg on one side of the body AND [2] sudden onset AND [3] present now   Negative: [1] Loss of speech or garbled speech AND [2] sudden onset AND [3] present now   Negative: Overdose (accidental or intentional) of medications   Negative: [1] Fainted > 15 minutes ago AND [2] still feels too weak or dizzy to stand   Negative: Heart beating < 50 beats per minute OR > 140 beats per minute   Negative: Sounds like a life-threatening emergency to the triager   Negative: Chest pain   Negative: Rectal bleeding, bloody stool, or tarry-black stool   Negative: [1] Vomiting AND [2] contains red blood or black (\"coffee ground\") material   Negative: Vomiting is main symptom   Negative: Diarrhea is main symptom   Negative: Headache is main symptom   Negative: Patient states that they are having an anxiety or panic attack   Negative: Dizziness from low blood sugar (i.e., < 60 mg/dl or 3.5 mmol/l)   Negative: Dizziness is described as a spinning sensation (i.e., vertigo)   Negative: Heat exhaustion suspected (i.e., dehydration from heat exposure)   " "Negative: Difficulty breathing   Negative: SEVERE dizziness (e.g., unable to stand, requires support to walk, feels like passing out now)   Negative: Extra heartbeats, irregular heart beating, or heart is beating very fast  (i.e., \"palpitations\")   Negative: [1] Drinking very little AND [2] dehydration suspected (e.g., no urine > 12 hours, very dry mouth, very lightheaded)   Negative: [1] Weakness (i.e., paralysis, loss of muscle strength) of the face, arm / hand, or leg / foot on one side of the body AND [2] sudden onset AND [3] brief (now gone)   Negative: [1] Numbness (i.e., loss of sensation) of the face, arm / hand, or leg / foot on one side of the body AND [2] sudden onset AND [3] brief (now gone)   Negative: [1] Loss of speech or garbled speech AND [2] sudden onset AND [3] brief (now gone)   Negative: Loss of vision or double vision  (Exception: Similar to previous migraines.)   Negative: Patient sounds very sick or weak to the triager   Negative: [1] Dizziness caused by heat exposure, sudden standing, or poor fluid intake AND [2] no improvement after 2 hours of rest and fluids   Negative: [1] Fever > 103 F (39.4 C) AND [2] not able to get the fever down using Fever Care Advice   Negative: [1] Fever > 101 F (38.3 C) AND [2] age > 60 years   Negative: [1] Fever > 100.0 F (37.8 C) AND [2] bedridden (e.g., CVA, chronic illness, recovering from surgery)   Negative: [1] Fever > 100.0 F (37.8 C) AND [2] diabetes mellitus or weak immune system (e.g., HIV positive, cancer chemo, splenectomy, organ transplant, chronic steroids)   Negative: Fever present > 3 days (72 hours)    Answer Assessment - Initial Assessment Questions  1. DESCRIPTION: \"Describe your dizziness.\"      Felt dizzy and lightheaded earlier lasted 2 hours  2. LIGHTHEADED: \"Do you feel lightheaded?\" (e.g., somewhat faint, woozy, weak upon standing)      Not now but almost passed out earlier  3. VERTIGO: \"Do you feel like either you or the room is " "spinning or tilting?\" (i.e. vertigo)      Room was spinning  4. SEVERITY: \"How bad is it?\"  \"Do you feel like you are going to faint?\" \"Can you stand and walk?\"    - MILD: Feels slightly dizzy, but walking normally.    - MODERATE: Feels unsteady when walking, but not falling; interferes with normal activities (e.g., school, work).    - SEVERE: Unable to walk without falling, or requires assistance to walk without falling; feels like passing out now.       Feeling fine now  5. ONSET:  \"When did the dizziness begin?\"      This morning, 2 hours  6. AGGRAVATING FACTORS: \"Does anything make it worse?\" (e.g., standing, change in head position)      Standing mad it worse  7. HEART RATE: \"Can you tell me your heart rate?\" \"How many beats in 15 seconds?\"  (Note: not all patients can do this)        no  8. CAUSE: \"What do you think is causing the dizziness?\"      Unknown maybe blood pressure  9. RECURRENT SYMPTOM: \"Have you had dizziness before?\" If Yes, ask: \"When was the last time?\" \"What happened that time?\"      had  10. OTHER SYMPTOMS: \"Do you have any other symptoms?\" (e.g., fever, chest pain, vomiting, diarrhea, bleeding)        no  11. PREGNANCY: \"Is there any chance you are pregnant?\" \"When was your last menstrual period?\"        no    Protocols used: Dizziness - Lightheadedness-ADULT-AH    "

## 2023-09-05 NOTE — TELEPHONE ENCOUNTER
[facility], I went to work as normal, all of sudden got dizzy, almost passed out, lightheaded, went to break, still having the feeling. I went to bed, woke up better, but head feels heavy, not dizzy or lightheaded right now. Feels fine now she states, tried to transfer to office no answer. Transferred to [facility] to make appt. For tomorrow, if gets worse be seen in ER, if cannot be seen in office go to  or ER, drink fluids and rest. No symptoms this afternoon, thinks maybe be blood pressure meds, has not taken her Blood pressure she states. When asked.

## 2024-01-22 ENCOUNTER — OFFICE VISIT (OUTPATIENT)
Dept: FAMILY MEDICINE CLINIC | Facility: CLINIC | Age: 49
End: 2024-01-22
Payer: COMMERCIAL

## 2024-01-22 VITALS
DIASTOLIC BLOOD PRESSURE: 100 MMHG | HEART RATE: 86 BPM | HEIGHT: 65 IN | SYSTOLIC BLOOD PRESSURE: 154 MMHG | BODY MASS INDEX: 38.15 KG/M2 | OXYGEN SATURATION: 99 % | TEMPERATURE: 97.9 F | WEIGHT: 229 LBS

## 2024-01-22 DIAGNOSIS — R30.0 DYSURIA: Primary | ICD-10-CM

## 2024-01-22 DIAGNOSIS — N30.01 ACUTE CYSTITIS WITH HEMATURIA: ICD-10-CM

## 2024-01-22 LAB
BILIRUB BLD-MCNC: NEGATIVE MG/DL
CLARITY, POC: ABNORMAL
COLOR UR: YELLOW
EXPIRATION DATE: ABNORMAL
GLUCOSE UR STRIP-MCNC: NEGATIVE MG/DL
KETONES UR QL: NEGATIVE
LEUKOCYTE EST, POC: ABNORMAL
Lab: ABNORMAL
NITRITE UR-MCNC: POSITIVE MG/ML
PH UR: 6.5 [PH] (ref 5–8)
PROT UR STRIP-MCNC: ABNORMAL MG/DL
RBC # UR STRIP: ABNORMAL /UL
SP GR UR: 1.02 (ref 1–1.03)
UROBILINOGEN UR QL: ABNORMAL

## 2024-01-22 PROCEDURE — 99213 OFFICE O/P EST LOW 20 MIN: CPT | Performed by: NURSE PRACTITIONER

## 2024-01-22 PROCEDURE — 81003 URINALYSIS AUTO W/O SCOPE: CPT | Performed by: NURSE PRACTITIONER

## 2024-01-22 RX ORDER — CIPROFLOXACIN 500 MG/1
500 TABLET, FILM COATED ORAL 2 TIMES DAILY
Qty: 10 TABLET | Refills: 0 | Status: SHIPPED | OUTPATIENT
Start: 2024-01-22

## 2024-01-22 NOTE — PROGRESS NOTES
"Chief Complaint  Difficulty Urinating        Odalis Nair presents to [facility]        Subjective      48-year-old female patient presents today with complaints of dysuria.    Was with urinary symptoms about 2 weeks ago.  Went to urgent care [facility] in [place] was diagnosed with UTI and prescribed Macrobid.  Completed antibiotic on Saturday. She is with continued pressure and decreased urination with urgency.  No fevers chills or flank pain.                    Objective         Vital Signs:     /100 (BP Location: Right arm, Patient Position: Sitting, Cuff Size: Adult)   Pulse 86   Temp 97.9 °F (36.6 °C) (Infrared)   Ht 165.1 cm (65\")   Wt 104 kg (229 lb)   SpO2 99%   BMI 38.11 kg/m²       Physical Exam  Constitutional:       Appearance: She is well-developed. She is obese.      Comments:      HENT:      Head: Normocephalic and atraumatic.      Nose: Nose normal.      Mouth/Throat:      Mouth: Mucous membranes are moist.      Pharynx: Oropharynx is clear.   Eyes:      Conjunctiva/sclera: Conjunctivae normal.      Pupils: Pupils are equal, round, and reactive to light.   Cardiovascular:      Rate and Rhythm: Normal rate.   Pulmonary:      Effort: Pulmonary effort is normal. No respiratory distress.   Abdominal:      General: Bowel sounds are normal. There is no distension.      Palpations: Abdomen is soft. There is no mass.      Tenderness: There is no abdominal tenderness. There is no guarding or rebound.      Hernia: No hernia is present.   Musculoskeletal:         General: Normal range of motion.      Cervical back: Normal range of motion.   Skin:     General: Skin is warm and dry.      Findings: No rash.   Neurological:      Mental Status: She is alert and oriented to person, place, and time.   Psychiatric:         Behavior: Behavior normal.                History of Present Illness      Patient Active Problem List   Diagnosis    Hypokalemia    " Essential hypertension    Anemia    Anxiety    Carotid artery occlusion    Chronic coronary artery disease    Dyslipidemia    Dyspnea on exertion    Hepatitis C    History of stroke    Lung nodule    Mixed anxiety depressive disorder    Obesity    Obstructive sleep apnea    Pulmonary hypertension    Tobacco dependence syndrome    Vitamin B12 deficiency    Establishing care with new doctor, encounter for    Dental abscess    Jaw swelling    Acute cystitis with hematuria    Dysuria         Past Medical History:   Diagnosis Date    Adnexal cyst     LESION    Anxiety     B12 deficiency     Coronary artery disease     Depression     Hepatitis C     TREATMENT PENDING GI    Hyperlipidemia     Hypertension     Hypothyroidism     Obesity     MICHELE (obstructive sleep apnea)     PAD (peripheral artery disease)     Paranasal sinus disease     Pulmonary nodule     Snoring     Stroke     LEFT LIP AND SLIGHT FACIAL DROOP 100% OCCLUSION OF  TELMA    Tobacco use     UTI (urinary tract infection)           Family History   Problem Relation Age of Onset    Stroke Mother     Heart disease Mother     Hypertension Mother     Thyroid disease Father     No Known Problems Daughter     No Known Problems Son     Cancer Maternal Grandmother     Bone cancer Maternal Grandmother     Brain cancer Maternal Grandmother     Prostate cancer Maternal Grandfather           Past Surgical History:   Procedure Laterality Date     SECTION      CHOLECYSTECTOMY      ENDOMETRIAL ABLATION      HYSTEROSCOPY ENDOMETRIAL ABLATION      TONSILLECTOMY      TUBAL ABDOMINAL LIGATION Bilateral           Social History     Socioeconomic History    Marital status:    Tobacco Use    Smoking status: Every Day     Packs/day: 2.00     Years: 24.00     Additional pack years: 0.00     Total pack years: 48.00     Types: Cigarettes     Start date:     Smokeless tobacco: Never    Tobacco comments:     REFUSES NICOTINE PATCHES, SMOKING CESSATION, CHANTIX, ECT.     Vaping Use    Vaping Use: Never used   Substance and Sexual Activity    Alcohol use: No    Drug use: Not Currently     Types: Methamphetamines    Sexual activity: Defer                    Result Review :                                           Assessment and Plan      Diagnoses and all orders for this visit:    1. Dysuria (Primary)  -     POC Urinalysis Dipstick, Automated  -     Urine Culture - Urine, Urine, Clean Catch  -     ciprofloxacin (Cipro) 500 MG tablet; Take 1 tablet by mouth 2 (Two) Times a Day.  Dispense: 10 tablet; Refill: 0    2. Acute cystitis with hematuria  -     Urine Culture - Urine, Urine, Clean Catch  -     ciprofloxacin (Cipro) 500 MG tablet; Take 1 tablet by mouth 2 (Two) Times a Day.  Dispense: 10 tablet; Refill: 0      UA positive today.  Will go ahead and place patient on Cipro since she just finished Macrobid and send urine for culture.  Will notify of results when they return.  Advise increase water intake decrease caffeine intake.                      Follow Up       No follow-ups on file.      Patient was given instructions and counseling regarding her condition or for health maintenance advice. Please see specific information pulled into the AVS if appropriate.     Margarita Smith, APRN1/22/202413:23 EST  This note has been electronically signed

## 2024-01-25 LAB
BACTERIA UR CULT: ABNORMAL
BACTERIA UR CULT: ABNORMAL
OTHER ANTIBIOTIC SUSC ISLT: ABNORMAL

## 2024-01-26 ENCOUNTER — TELEPHONE (OUTPATIENT)
Dept: FAMILY MEDICINE CLINIC | Facility: CLINIC | Age: 49
End: 2024-01-26
Payer: COMMERCIAL

## 2024-01-26 NOTE — TELEPHONE ENCOUNTER
Attempting to contact pt, no answer and unable to leave message    Hub relay    Patient has been placed on appropriate antibiotic for UTI.  If continues with issues please return

## 2024-01-26 NOTE — TELEPHONE ENCOUNTER
----- Message from MIMI Uribe sent at 1/25/2024  3:39 PM EST -----  Patient has been placed on appropriate antibiotic for UTI.  If continues with issues please return

## 2024-02-05 ENCOUNTER — OFFICE VISIT (OUTPATIENT)
Dept: FAMILY MEDICINE CLINIC | Facility: CLINIC | Age: 49
End: 2024-02-05
Payer: COMMERCIAL

## 2024-02-05 VITALS
OXYGEN SATURATION: 98 % | SYSTOLIC BLOOD PRESSURE: 148 MMHG | BODY MASS INDEX: 38.15 KG/M2 | WEIGHT: 229 LBS | TEMPERATURE: 97.2 F | HEIGHT: 65 IN | DIASTOLIC BLOOD PRESSURE: 103 MMHG | HEART RATE: 83 BPM

## 2024-02-05 DIAGNOSIS — Z71.1 CONCERN ABOUT STD IN FEMALE WITHOUT DIAGNOSIS: ICD-10-CM

## 2024-02-05 DIAGNOSIS — N89.8 VAGINAL DISCHARGE: Primary | ICD-10-CM

## 2024-02-05 PROCEDURE — 99213 OFFICE O/P EST LOW 20 MIN: CPT | Performed by: NURSE PRACTITIONER

## 2024-02-05 RX ORDER — FLUCONAZOLE 150 MG/1
150 TABLET ORAL ONCE
Qty: 1 TABLET | Refills: 0 | Status: SHIPPED | OUTPATIENT
Start: 2024-02-05 | End: 2024-02-05

## 2024-02-05 NOTE — PROGRESS NOTES
"Chief Complaint  Vaginal Discharge        Odalis Nair presents to [facility]        Subjective      48-year-old female patient presents today with complaints of vaginal discharge.    Reports symptoms began about a week ago.  Discharge is thick white.  Denies any itching, irritation, burning, odor.  She is sexually active with a partner for about 5 months and would like to be tested for STDs as well.  Patient has been on a recent round of antibiotics.                    Objective         Vital Signs:     BP (!) 148/103 (BP Location: Right arm, Patient Position: Sitting, Cuff Size: Adult)   Pulse 83   Temp 97.2 °F (36.2 °C) (Infrared)   Ht 165.1 cm (65\")   Wt 104 kg (229 lb)   SpO2 98%   BMI 38.11 kg/m²       Physical Exam  Vitals reviewed.   Constitutional:       Appearance: She is well-developed. She is obese.      Comments:      HENT:      Head: Normocephalic and atraumatic.      Mouth/Throat:      Mouth: Mucous membranes are moist.      Pharynx: Oropharynx is clear.   Eyes:      Conjunctiva/sclera: Conjunctivae normal.      Pupils: Pupils are equal, round, and reactive to light.   Cardiovascular:      Rate and Rhythm: Normal rate.   Pulmonary:      Effort: Pulmonary effort is normal. No respiratory distress.   Abdominal:      General: There is no distension.      Palpations: Abdomen is soft. There is no mass.      Tenderness: There is no abdominal tenderness. There is no guarding or rebound.      Hernia: No hernia is present.   Musculoskeletal:         General: Normal range of motion.      Cervical back: Normal range of motion.   Skin:     General: Skin is warm and dry.      Findings: No rash.   Neurological:      Mental Status: She is alert and oriented to person, place, and time.   Psychiatric:         Behavior: Behavior normal.                History of Present Illness      Patient Active Problem List   Diagnosis    Hypokalemia    Essential hypertension    " Anemia    Anxiety    Carotid artery occlusion    Chronic coronary artery disease    Dyslipidemia    Dyspnea on exertion    Hepatitis C    History of stroke    Lung nodule    Mixed anxiety depressive disorder    Obesity    Obstructive sleep apnea    Pulmonary hypertension    Tobacco dependence syndrome    Vitamin B12 deficiency    Establishing care with new doctor, encounter for    Dental abscess    Jaw swelling    Acute cystitis with hematuria    Dysuria    Vaginal discharge    Concern about STD in female without diagnosis         Past Medical History:   Diagnosis Date    Adnexal cyst     LESION    Anxiety     B12 deficiency     Coronary artery disease     Depression     Hepatitis C     TREATMENT PENDING GI    Hyperlipidemia     Hypertension     Hypothyroidism     Obesity     MICHELE (obstructive sleep apnea)     PAD (peripheral artery disease)     Paranasal sinus disease     Pulmonary nodule     Snoring     Stroke     LEFT LIP AND SLIGHT FACIAL DROOP 100% OCCLUSION OF  TELMA    Tobacco use     UTI (urinary tract infection)           Family History   Problem Relation Age of Onset    Stroke Mother     Heart disease Mother     Hypertension Mother     Thyroid disease Father     No Known Problems Daughter     No Known Problems Son     Cancer Maternal Grandmother     Bone cancer Maternal Grandmother     Brain cancer Maternal Grandmother     Prostate cancer Maternal Grandfather           Past Surgical History:   Procedure Laterality Date     SECTION      CHOLECYSTECTOMY      ENDOMETRIAL ABLATION      HYSTEROSCOPY ENDOMETRIAL ABLATION      TONSILLECTOMY      TUBAL ABDOMINAL LIGATION Bilateral           Social History     Socioeconomic History    Marital status:    Tobacco Use    Smoking status: Every Day     Packs/day: 2.00     Years: 24.00     Additional pack years: 0.00     Total pack years: 48.00     Types: Cigarettes     Start date:     Smokeless tobacco: Never    Tobacco comments:     REFUSES NICOTINE  PATCHES, SMOKING CESSATION, CHANTIX, ECT.    Vaping Use    Vaping Use: Never used   Substance and Sexual Activity    Alcohol use: No    Drug use: Not Currently     Types: Methamphetamines    Sexual activity: Defer                    Result Review :                                         Assessment and Plan      Diagnoses and all orders for this visit:    1. Vaginal discharge (Primary)  -     fluconazole (Diflucan) 150 MG tablet; Take 1 tablet by mouth 1 (One) Time for 1 dose.  Dispense: 1 tablet; Refill: 0    2. Concern about STD in female without diagnosis  -     Hepatitis panel, acute  -     HSV 1 and 2-Specific Ab, IgG  -     RPR  -     HIV-1/O/2 ANTIGEN/ANTIBODY, 4TH GENERATION  -     Hepatitis C RNA, quantitative, PCR (graph)  -     Chlamydia trachomatis, Neisseria gonorrhoeae, Trichomonas vaginalis, PCR - Urine, Urine, Clean Catch        Patient vaginal discharge likely vaginal yeast from recent antibiotic treatment.  Will go ahead and treat with Diflucan.  In the meantime patient is also concerned about STD.  Will assess the above and notify patient of results when they return.  Will discuss in detail results if anything should be positive.                    Follow Up       No follow-ups on file.      Patient was given instructions and counseling regarding her condition or for health maintenance advice. Please see specific information pulled into the AVS if appropriate.     Margarita Smith, APRN2/5/202413:47 EST  This note has been electronically signed

## 2024-02-06 LAB
HIV 1+2 AB+HIV1 P24 AG SERPL QL IA: NON REACTIVE
HSV1 IGG SER IA-ACNC: <0.91 INDEX (ref 0–0.9)
HSV2 IGG SER IA-ACNC: 14.3 INDEX (ref 0–0.9)
RPR SER QL: NON REACTIVE

## 2024-02-07 LAB
C TRACH RRNA SPEC QL NAA+PROBE: NEGATIVE
HCV RNA SERPL NAA+PROBE-ACNC: NORMAL IU/ML
N GONORRHOEA RRNA SPEC QL NAA+PROBE: NEGATIVE
T VAGINALIS RRNA SPEC QL NAA+PROBE: NEGATIVE
TEST INFORMATION: NORMAL

## 2024-02-08 DIAGNOSIS — B00.9 HSV-2 INFECTION: Primary | ICD-10-CM

## 2024-02-08 NOTE — PROGRESS NOTES
Patient is positive for HSV-2.  This infection is typically transmitted from person to person during sexual intercourse .  Although infections can be asymptomatic, they can produce a variety of signs and symptoms. Symptoms experienced may be fevers, headaches, fatigue, aching, localized pain or itching, burning with urination, enlarged lymph nodes and genital lesions.  It is advised to avoid any oral or sexual intercourse during these episodes as the virus is very transmittable.  There is no cure but treatment can occur when there is an outbreak.  If lesions are present, prescription treatment is recommended to shorten the duration and severity of lesions however, an appointment is necessary. I would recommend patient follow up with gynecology. She does not need a referral.

## 2024-02-09 LAB
DIAGNOSTIC IMP SPEC-IMP: NORMAL
HAV IGM SERPL QL IA: NEGATIVE
HBV CORE IGM SERPL QL IA: NEGATIVE
HBV SURFACE AG SERPL QL IA: NEGATIVE
HCV IGG SERPL QL IA: REACTIVE
HCV RNA SERPL NAA+PROBE-ACNC: NORMAL IU/ML
REF LAB TEST REF RANGE: NORMAL